# Patient Record
Sex: MALE | Race: WHITE | NOT HISPANIC OR LATINO | URBAN - METROPOLITAN AREA
[De-identification: names, ages, dates, MRNs, and addresses within clinical notes are randomized per-mention and may not be internally consistent; named-entity substitution may affect disease eponyms.]

---

## 2018-03-09 ENCOUNTER — OUTPATIENT (OUTPATIENT)
Dept: OUTPATIENT SERVICES | Facility: HOSPITAL | Age: 48
LOS: 1 days | Discharge: HOME | End: 2018-03-09
Payer: COMMERCIAL

## 2018-03-09 VITALS
SYSTOLIC BLOOD PRESSURE: 115 MMHG | RESPIRATION RATE: 18 BRPM | DIASTOLIC BLOOD PRESSURE: 80 MMHG | HEART RATE: 72 BPM | WEIGHT: 174.17 LBS | OXYGEN SATURATION: 97 % | TEMPERATURE: 98 F | HEIGHT: 70 IN

## 2018-03-09 DIAGNOSIS — E78.5 HYPERLIPIDEMIA, UNSPECIFIED: ICD-10-CM

## 2018-03-09 DIAGNOSIS — Z98.890 OTHER SPECIFIED POSTPROCEDURAL STATES: Chronic | ICD-10-CM

## 2018-03-09 DIAGNOSIS — M54.12 RADICULOPATHY, CERVICAL REGION: ICD-10-CM

## 2018-03-09 DIAGNOSIS — Z01.818 ENCOUNTER FOR OTHER PREPROCEDURAL EXAMINATION: ICD-10-CM

## 2018-03-09 DIAGNOSIS — Z98.1 ARTHRODESIS STATUS: Chronic | ICD-10-CM

## 2018-03-09 DIAGNOSIS — K21.9 GASTRO-ESOPHAGEAL REFLUX DISEASE WITHOUT ESOPHAGITIS: ICD-10-CM

## 2018-03-09 LAB
ANION GAP SERPL CALC-SCNC: 7 MMOL/L — SIGNIFICANT CHANGE UP (ref 5–17)
APTT BLD: 35.6 SEC — SIGNIFICANT CHANGE UP (ref 27.5–37.4)
BASOPHILS # BLD AUTO: 0.14 K/UL — SIGNIFICANT CHANGE UP (ref 0–0.2)
BASOPHILS NFR BLD AUTO: 1.6 % — SIGNIFICANT CHANGE UP (ref 0–2)
BUN SERPL-MCNC: 16 MG/DL — SIGNIFICANT CHANGE UP (ref 7–23)
CALCIUM SERPL-MCNC: 9.1 MG/DL — SIGNIFICANT CHANGE UP (ref 8.5–10.1)
CHLORIDE SERPL-SCNC: 105 MMOL/L — SIGNIFICANT CHANGE UP (ref 96–108)
CO2 SERPL-SCNC: 29 MMOL/L — SIGNIFICANT CHANGE UP (ref 22–31)
CREAT SERPL-MCNC: 0.9 MG/DL — SIGNIFICANT CHANGE UP (ref 0.5–1.3)
EOSINOPHIL # BLD AUTO: 0.55 K/UL — HIGH (ref 0–0.5)
EOSINOPHIL NFR BLD AUTO: 6.5 % — HIGH (ref 0–6)
GLUCOSE SERPL-MCNC: 87 MG/DL — SIGNIFICANT CHANGE UP (ref 70–99)
HBA1C BLD-MCNC: 5.4 % — SIGNIFICANT CHANGE UP (ref 4–5.6)
HCT VFR BLD CALC: 45.6 % — SIGNIFICANT CHANGE UP (ref 39–50)
HGB BLD-MCNC: 15.2 G/DL — SIGNIFICANT CHANGE UP (ref 13–17)
IMM GRANULOCYTES NFR BLD AUTO: 0.8 % — SIGNIFICANT CHANGE UP (ref 0–1.5)
INR BLD: 0.95 RATIO — SIGNIFICANT CHANGE UP (ref 0.88–1.16)
LYMPHOCYTES # BLD AUTO: 2.02 K/UL — SIGNIFICANT CHANGE UP (ref 1–3.3)
LYMPHOCYTES # BLD AUTO: 23.8 % — SIGNIFICANT CHANGE UP (ref 13–44)
MCHC RBC-ENTMCNC: 29.6 PG — SIGNIFICANT CHANGE UP (ref 27–34)
MCHC RBC-ENTMCNC: 33.3 GM/DL — SIGNIFICANT CHANGE UP (ref 32–36)
MCV RBC AUTO: 88.7 FL — SIGNIFICANT CHANGE UP (ref 80–100)
MONOCYTES # BLD AUTO: 0.66 K/UL — SIGNIFICANT CHANGE UP (ref 0–0.9)
MONOCYTES NFR BLD AUTO: 7.8 % — SIGNIFICANT CHANGE UP (ref 2–14)
NEUTROPHILS # BLD AUTO: 5.06 K/UL — SIGNIFICANT CHANGE UP (ref 1.8–7.4)
NEUTROPHILS NFR BLD AUTO: 59.5 % — SIGNIFICANT CHANGE UP (ref 43–77)
PLATELET # BLD AUTO: 304 K/UL — SIGNIFICANT CHANGE UP (ref 150–400)
POTASSIUM SERPL-MCNC: 3.7 MMOL/L — SIGNIFICANT CHANGE UP (ref 3.5–5.3)
POTASSIUM SERPL-SCNC: 3.7 MMOL/L — SIGNIFICANT CHANGE UP (ref 3.5–5.3)
PROTHROM AB SERPL-ACNC: 10.3 SEC — SIGNIFICANT CHANGE UP (ref 9.8–12.7)
RBC # BLD: 5.14 M/UL — SIGNIFICANT CHANGE UP (ref 4.2–5.8)
RBC # FLD: 14 % — SIGNIFICANT CHANGE UP (ref 10.3–14.5)
SODIUM SERPL-SCNC: 141 MMOL/L — SIGNIFICANT CHANGE UP (ref 135–145)
WBC # BLD: 8.5 K/UL — SIGNIFICANT CHANGE UP (ref 3.8–10.5)
WBC # FLD AUTO: 8.5 K/UL — SIGNIFICANT CHANGE UP (ref 3.8–10.5)

## 2018-03-09 PROCEDURE — 93010 ELECTROCARDIOGRAM REPORT: CPT | Mod: NC

## 2018-03-09 NOTE — ASU PATIENT PROFILE, ADULT - PSH
S/P arthroscopy of shoulder  Left ( 2013 )  S/P foot surgery, right  2015  S/P lumbar spinal fusion  2010

## 2018-03-09 NOTE — H&P PST ADULT - NSANTHOSAYNRD_GEN_A_CORE
No. DIRK screening performed.  STOP BANG Legend: 0-2 = LOW Risk; 3-4 = INTERMEDIATE Risk; 5-8 = HIGH Risk

## 2018-03-22 ENCOUNTER — TRANSCRIPTION ENCOUNTER (OUTPATIENT)
Age: 48
End: 2018-03-22

## 2018-03-23 ENCOUNTER — RESULT REVIEW (OUTPATIENT)
Age: 48
End: 2018-03-23

## 2018-03-23 ENCOUNTER — OUTPATIENT (OUTPATIENT)
Dept: OUTPATIENT SERVICES | Facility: HOSPITAL | Age: 48
LOS: 1 days | Discharge: HOME | End: 2018-03-23

## 2018-03-23 ENCOUNTER — INPATIENT (INPATIENT)
Facility: HOSPITAL | Age: 48
LOS: 0 days | Discharge: HOME | End: 2018-03-23
Attending: ORTHOPAEDIC SURGERY | Admitting: ORTHOPAEDIC SURGERY
Payer: COMMERCIAL

## 2018-03-23 VITALS
HEIGHT: 70 IN | TEMPERATURE: 98 F | HEART RATE: 73 BPM | RESPIRATION RATE: 17 BRPM | SYSTOLIC BLOOD PRESSURE: 11 MMHG | WEIGHT: 175.05 LBS | DIASTOLIC BLOOD PRESSURE: 72 MMHG | OXYGEN SATURATION: 98 %

## 2018-03-23 VITALS
OXYGEN SATURATION: 96 % | SYSTOLIC BLOOD PRESSURE: 117 MMHG | DIASTOLIC BLOOD PRESSURE: 88 MMHG | HEART RATE: 74 BPM | TEMPERATURE: 98 F | RESPIRATION RATE: 16 BRPM

## 2018-03-23 DIAGNOSIS — Z98.1 ARTHRODESIS STATUS: Chronic | ICD-10-CM

## 2018-03-23 DIAGNOSIS — Z98.890 OTHER SPECIFIED POSTPROCEDURAL STATES: Chronic | ICD-10-CM

## 2018-03-23 PROCEDURE — 88304 TISSUE EXAM BY PATHOLOGIST: CPT | Mod: 26

## 2018-03-23 RX ORDER — PANTOPRAZOLE SODIUM 20 MG/1
40 TABLET, DELAYED RELEASE ORAL
Qty: 0 | Refills: 0 | Status: DISCONTINUED | OUTPATIENT
Start: 2018-03-23 | End: 2018-03-23

## 2018-03-23 RX ORDER — ACETAMINOPHEN 500 MG
1000 TABLET ORAL ONCE
Qty: 0 | Refills: 0 | Status: COMPLETED | OUTPATIENT
Start: 2018-03-23 | End: 2018-03-23

## 2018-03-23 RX ORDER — SODIUM CHLORIDE 9 MG/ML
1000 INJECTION, SOLUTION INTRAVENOUS
Qty: 0 | Refills: 0 | Status: DISCONTINUED | OUTPATIENT
Start: 2018-03-23 | End: 2018-03-23

## 2018-03-23 RX ORDER — HYDROMORPHONE HYDROCHLORIDE 2 MG/ML
1 INJECTION INTRAMUSCULAR; INTRAVENOUS; SUBCUTANEOUS
Qty: 0 | Refills: 0 | Status: DISCONTINUED | OUTPATIENT
Start: 2018-03-23 | End: 2018-03-23

## 2018-03-23 RX ORDER — OXYCODONE HYDROCHLORIDE 5 MG/1
1 TABLET ORAL
Qty: 30 | Refills: 0 | OUTPATIENT
Start: 2018-03-23 | End: 2018-03-27

## 2018-03-23 RX ORDER — CEFAZOLIN SODIUM 1 G
2000 VIAL (EA) INJECTION EVERY 8 HOURS
Qty: 0 | Refills: 0 | Status: DISCONTINUED | OUTPATIENT
Start: 2018-03-23 | End: 2018-03-23

## 2018-03-23 RX ORDER — OXYCODONE HYDROCHLORIDE 5 MG/1
5 TABLET ORAL EVERY 4 HOURS
Qty: 0 | Refills: 0 | Status: DISCONTINUED | OUTPATIENT
Start: 2018-03-23 | End: 2018-03-23

## 2018-03-23 RX ORDER — DIPHENHYDRAMINE HCL 50 MG
12.5 CAPSULE ORAL EVERY 4 HOURS
Qty: 0 | Refills: 0 | Status: DISCONTINUED | OUTPATIENT
Start: 2018-03-23 | End: 2018-03-23

## 2018-03-23 RX ORDER — LORATADINE 10 MG/1
10 TABLET ORAL DAILY
Qty: 0 | Refills: 0 | Status: DISCONTINUED | OUTPATIENT
Start: 2018-03-23 | End: 2018-03-23

## 2018-03-23 RX ORDER — CYCLOBENZAPRINE HYDROCHLORIDE 10 MG/1
10 TABLET, FILM COATED ORAL EVERY 8 HOURS
Qty: 0 | Refills: 0 | Status: DISCONTINUED | OUTPATIENT
Start: 2018-03-23 | End: 2018-03-23

## 2018-03-23 RX ORDER — OXYCODONE HYDROCHLORIDE 5 MG/1
10 TABLET ORAL EVERY 4 HOURS
Qty: 0 | Refills: 0 | Status: DISCONTINUED | OUTPATIENT
Start: 2018-03-23 | End: 2018-03-23

## 2018-03-23 RX ORDER — SIMVASTATIN 20 MG/1
20 TABLET, FILM COATED ORAL AT BEDTIME
Qty: 0 | Refills: 0 | Status: DISCONTINUED | OUTPATIENT
Start: 2018-03-23 | End: 2018-03-23

## 2018-03-23 RX ORDER — METOCLOPRAMIDE HCL 10 MG
10 TABLET ORAL ONCE
Qty: 0 | Refills: 0 | Status: DISCONTINUED | OUTPATIENT
Start: 2018-03-23 | End: 2018-03-23

## 2018-03-23 RX ADMIN — Medication 1000 MILLIGRAM(S): at 17:05

## 2018-03-23 RX ADMIN — Medication 400 MILLIGRAM(S): at 16:50

## 2018-03-23 RX ADMIN — SODIUM CHLORIDE 75 MILLILITER(S): 9 INJECTION, SOLUTION INTRAVENOUS at 16:25

## 2018-03-23 NOTE — ASU DISCHARGE PLAN (ADULT/PEDIATRIC). - NOTIFY
Swelling that continues/Excessive Diarrhea/Inability to Tolerate Liquids or Foods/Pain not relieved by Medications/Fever greater than 101/Increased Irritability or Sluggishness/Unable to Urinate/Numbness, color, or temperature change to extremity/Persistent Nausea and Vomiting/Numbness, tingling/Bleeding that does not stop

## 2018-03-23 NOTE — PROGRESS NOTE ADULT - SUBJECTIVE AND OBJECTIVE BOX
48yMale s/p TDR c5-7 POD#0 by Dr. Truong. Pt seen and examined in NAD. Pain controlled. Pt denies any new complaints. Pt denies CP/SOB/N/V/D/numbness/tingling/bowel or bladder dysfunction. Pt would like to go home tonight. Explained to pt, maybe better to monitor pt overnight but pt is adamant he wanted to go home and was told by Dr. Truong if he wanted to go home he may go home.     PE:     Spine: Dressing c/d/i   B/L UE: Skin intact. +ROM shoulder/elbow/wrist/fingers. +ok/thumbsup/fingercross signs.  strength: 5/5.  RP2+ NVI.   B/L LE: Skin intact. +ROM hip/knee/ankle/toes. Ankle Dorsi/plantarflexion: 5/5. Calf: soft, compressible and nontender. DP/PT 2+ NVI.             A/P: 48yMale s/p TDR c5-7 POD#0   Case discussed with Dr. Truong - if pt feels well and vitals are stable with no symptoms, pt may be discharged tonight on POD#0. Tried to explain to pt it may be better to be monitored over night for pain control but pt again insists he would like to go home. Pain medication was called into pharmacy. Pt vitals are stable. Pt was able to void and tolerated PO intake. As per Dr. Truong, pt does not need his remaining scips antibiotics. As per Dr. Truong pt is safe for discharge tonight.  Pain controlled  PT: WBAT - spinal precautions   DVT ppx: SCDs   Wound care, Isometric exercises, incentive spirometry   Discharge: planning for home tonight  All the above discussed and understood by pt

## 2018-03-23 NOTE — ASU PREOP CHECKLIST - MUPIRONCIN COMMENTS
Pt became itchy around neck area after using chlorhexidine wipe. Pt instructed to wash neck off with wet cloths

## 2018-03-23 NOTE — ASU DISCHARGE PLAN (ADULT/PEDIATRIC). - MEDICATION SUMMARY - MEDICATIONS TO CHANGE
I will SWITCH the dose or number of times a day I take the medications listed below when I get home from the hospital:    Tylenol  -- 2 tab(s) by mouth prn

## 2018-03-23 NOTE — ASU DISCHARGE PLAN (ADULT/PEDIATRIC). - MEDICATION SUMMARY - MEDICATIONS TO TAKE
I will START or STAY ON the medications listed below when I get home from the hospital:    oxyCODONE 10 mg oral tablet  -- 1 tab(s) by mouth every 4 hours, As needed for pain MDD:6 tablets  -- Indication: For pain control    Tylenol  -- 975 milligram(s) by mouth every 8 hours as needed for pain   -- Indication: For pain control    ZyrTEC  -- 1 tab(s) by mouth once a day  -- Indication: For allergy    simvastatin  -- 1 tab(s) by mouth once a day  -- Indication: For cholesterol    Zegerid OTC 20 mg-1100 mg oral capsule  -- 1 cap(s) by mouth once a day  -- Indication: For proton pump inhibitor    Multiple Vitamins oral tablet  -- 1 tab(s) by mouth once a day  -- Indication: For wound healing

## 2018-03-23 NOTE — ASU DISCHARGE PLAN (ADULT/PEDIATRIC). - MEDICATION SUMMARY - MEDICATIONS TO STOP TAKING
I will STOP taking the medications listed below when I get home from the hospital:    Fish Oil  -- 1 tab(s) by mouth once a day

## 2018-03-23 NOTE — BRIEF OPERATIVE NOTE - PROCEDURE
<<-----Click on this checkbox to enter Procedure Cervical disc arthroplasty  03/23/2018  C 5/6, 6/7  Active  [facility]

## 2018-03-23 NOTE — ASU DISCHARGE PLAN (ADULT/PEDIATRIC). - SPECIAL INSTRUCTIONS
Keep dressing clean, Dry, intact. May shower with dressing on POD#5 (3/28/18). May remove dressing on POD#7 (3/30/18). Leave steri-strips intact - they will fall off by themselves Keep dressing clean, Dry, intact. May shower with dressing on POD#5 (3/28/18). May remove dressing on POD#7 (3/30/18). Leave steri-strips intact - they will fall off by themselves    Spinal Precautions:   NO NSAIDS/ANTICOAGULATION/ANTIPLATELET MEDICATIONS  NO FLEXION/EXTENSION/TWISTING/PUSHING/PULLING/CARRYING/LIFTING OR BENDING

## 2018-03-27 DIAGNOSIS — E78.5 HYPERLIPIDEMIA, UNSPECIFIED: ICD-10-CM

## 2018-03-27 DIAGNOSIS — K21.9 GASTRO-ESOPHAGEAL REFLUX DISEASE WITHOUT ESOPHAGITIS: ICD-10-CM

## 2018-03-27 DIAGNOSIS — M50.122 CERVICAL DISC DISORDER AT C5-C6 LEVEL WITH RADICULOPATHY: ICD-10-CM

## 2018-06-18 PROBLEM — Z00.00 ENCOUNTER FOR PREVENTIVE HEALTH EXAMINATION: Status: ACTIVE | Noted: 2018-06-18

## 2018-08-09 RX ORDER — OMEPRAZOLE AND SODIUM BICARBONATE 40; 1100 MG/1; MG/1
1 CAPSULE, GELATIN COATED ORAL
Qty: 0 | Refills: 0 | COMMUNITY

## 2018-08-09 RX ORDER — ACETAMINOPHEN 500 MG
2 TABLET ORAL
Qty: 0 | Refills: 0 | COMMUNITY

## 2018-08-09 RX ORDER — OMEGA-3 ACID ETHYL ESTERS 1 G
1 CAPSULE ORAL
Qty: 0 | Refills: 0 | COMMUNITY

## 2018-08-09 RX ORDER — SIMVASTATIN 20 MG/1
1 TABLET, FILM COATED ORAL
Qty: 0 | Refills: 0 | COMMUNITY

## 2018-08-09 RX ORDER — ACETAMINOPHEN 500 MG
975 TABLET ORAL
Qty: 0 | Refills: 0 | COMMUNITY

## 2018-08-09 RX ORDER — CETIRIZINE HYDROCHLORIDE 10 MG/1
1 TABLET ORAL
Qty: 0 | Refills: 0 | COMMUNITY

## 2018-08-15 ENCOUNTER — APPOINTMENT (OUTPATIENT)
Dept: OTOLARYNGOLOGY | Facility: CLINIC | Age: 48
End: 2018-08-15

## 2020-03-10 PROBLEM — J30.2 OTHER SEASONAL ALLERGIC RHINITIS: Chronic | Status: ACTIVE | Noted: 2018-03-23

## 2020-03-10 PROBLEM — K21.9 GASTRO-ESOPHAGEAL REFLUX DISEASE WITHOUT ESOPHAGITIS: Chronic | Status: ACTIVE | Noted: 2018-03-09

## 2020-03-10 PROBLEM — E78.5 HYPERLIPIDEMIA, UNSPECIFIED: Chronic | Status: ACTIVE | Noted: 2018-03-09

## 2020-05-04 ENCOUNTER — APPOINTMENT (OUTPATIENT)
Dept: OTOLARYNGOLOGY | Facility: CLINIC | Age: 50
End: 2020-05-04

## 2020-06-03 ENCOUNTER — TRANSCRIPTION ENCOUNTER (OUTPATIENT)
Age: 50
End: 2020-06-03

## 2020-06-03 ENCOUNTER — APPOINTMENT (OUTPATIENT)
Dept: UROLOGY | Facility: CLINIC | Age: 50
End: 2020-06-03
Payer: MEDICAID

## 2020-06-03 VITALS
SYSTOLIC BLOOD PRESSURE: 111 MMHG | DIASTOLIC BLOOD PRESSURE: 73 MMHG | TEMPERATURE: 98.1 F | OXYGEN SATURATION: 99 % | HEART RATE: 70 BPM

## 2020-06-03 DIAGNOSIS — R79.89 OTHER SPECIFIED ABNORMAL FINDINGS OF BLOOD CHEMISTRY: ICD-10-CM

## 2020-06-03 DIAGNOSIS — R37 SEXUAL DYSFUNCTION, UNSPECIFIED: ICD-10-CM

## 2020-06-03 DIAGNOSIS — Z86.39 PERSONAL HISTORY OF OTHER ENDOCRINE, NUTRITIONAL AND METABOLIC DISEASE: ICD-10-CM

## 2020-06-03 DIAGNOSIS — Z80.0 FAMILY HISTORY OF MALIGNANT NEOPLASM OF DIGESTIVE ORGANS: ICD-10-CM

## 2020-06-03 DIAGNOSIS — K57.90 DIVERTICULOSIS OF INTESTINE, PART UNSPECIFIED, W/OUT PERFORATION OR ABSCESS W/OUT BLEEDING: ICD-10-CM

## 2020-06-03 PROCEDURE — 99203 OFFICE O/P NEW LOW 30 MIN: CPT

## 2020-06-03 RX ORDER — SILDENAFIL 20 MG/1
20 TABLET ORAL
Qty: 30 | Refills: 0 | Status: ACTIVE | COMMUNITY
Start: 2020-06-03 | End: 1900-01-01

## 2020-06-03 NOTE — HISTORY OF PRESENT ILLNESS
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 decreased qualithy of erection\par good energy level\par exercises \par sleeps well

## 2020-06-03 NOTE — ASSESSMENT
[FreeTextEntry1] : Mr. Noriega is a 50-year-old gentleman with a past history of neck surgery and diverticulitis.  He was found to have a low testosterone on routine evaluation by his primary care physician.  Prior to that the patient was not aware of any symptoms of hypogonadism.  He does not have a sexual partner at this point.  He does not state that it is libido is somewhat decreased.  He is concerned that the quality of his erection is less reliable and notes that at times he has decreased distal rigidity.\par \par We discussed the potential significance of hypogonadism.  We discussed the signs and symptoms.  \par \par We had an extensive discussion re Risks of T replacement; Patient was informed about Black box warning from FDA.\par We discussed recent data regarding increased risk of coronary plaque size, heart disease; thrombolic event; increased Hbg/Hct, breast tenderness; acne; irritability; sleep apnea and increased urinary symptoms; effect on testicular function including suppression of spermatogenesis; hair loss (male pattern baldness) effect on LFTS; effect on prostate cancer.\par We discussed the need to repeat his testosterone level and expand his endocrinologic evaluation.\par \par We discussed the utilization of phosphodiesterase inhibitors.  He has previously taken these with excellent results without side effects.  Discussed PD5-I possible side effects, onset of action, duration of action, and food interactions.  Discussed behavioral strategies to optimize efficacy of medication.  \par DIscussed the potential advantages and disadvantages as well as side effect profiles of each.\par Warned re priapism and need for intervention to prevent permanent penile injury,\par Discussed dose escalation by as needed (with maximum of 5 tablets =100mg)\par Warned re priapism risk and need for immediate intervention if erection lasts more than 2 hours.  Patient instructed to report to an emergency room and explicitly inform staff of his condition and need for treatment.\par \par At this point repeat blood studies will be obtained.  He will be seen in follow-up in 2 weeks to assess his response to phosphodiesterase inhibitors and discussed potential treatment of hypogonadism if this is confirmed

## 2020-06-03 NOTE — PHYSICAL EXAM
[Normal Appearance] : normal appearance [Edema] : no peripheral edema [Exaggerated Use Of Accessory Muscles For Inspiration] : no accessory muscle use [Bowel Sounds] : normal bowel sounds [Urethral Meatus] : meatus normal [Penis Abnormality] : normal circumcised penis [Epididymis] : the epididymides were normal [Testes Tenderness] : no tenderness of the testes [Prostate Enlargement] : the prostate was not enlarged [Prostate Tenderness] : the prostate was not tender [Normal Station and Gait] : the gait and station were normal for the patient's age [Skin Color & Pigmentation] : normal skin color and pigmentation [No Focal Deficits] : no focal deficits [Oriented To Time, Place, And Person] : oriented to person, place, and time [Affect] : the affect was normal [Mood] : the mood was normal [Not Anxious] : not anxious [Inguinal Lymph Nodes Enlarged Bilaterally] : inguinal [FreeTextEntry1] : DTR, Babinski, proprioception normal

## 2020-06-04 DIAGNOSIS — R31.29 OTHER MICROSCOPIC HEMATURIA: ICD-10-CM

## 2020-06-04 LAB
APPEARANCE: CLEAR
BACTERIA: NEGATIVE
BILIRUBIN URINE: NEGATIVE
BLOOD URINE: NEGATIVE
COLOR: YELLOW
ESTRADIOL SERPL-MCNC: 15 PG/ML
GLUCOSE QUALITATIVE U: NEGATIVE
HYALINE CASTS: 0 /LPF
KETONES URINE: NEGATIVE
LEUKOCYTE ESTERASE URINE: NEGATIVE
MICROSCOPIC-UA: NORMAL
NITRITE URINE: NEGATIVE
PH URINE: 6
PROLACTIN SERPL-MCNC: 9.5 NG/ML
PROTEIN URINE: ABNORMAL
PSA SERPL-MCNC: 2.33 NG/ML
RED BLOOD CELLS URINE: 6 /HPF
SPECIFIC GRAVITY URINE: 1.03
SQUAMOUS EPITHELIAL CELLS: 0 /HPF
TESTOST SERPL-MCNC: 508 NG/DL
UROBILINOGEN URINE: NORMAL
WHITE BLOOD CELLS URINE: 1 /HPF

## 2020-06-09 LAB
APPEARANCE: CLEAR
BACTERIA: NEGATIVE
BILIRUBIN URINE: NEGATIVE
BLOOD URINE: NEGATIVE
COLOR: YELLOW
GLUCOSE QUALITATIVE U: NEGATIVE
HYALINE CASTS: 1 /LPF
KETONES URINE: NEGATIVE
LEUKOCYTE ESTERASE URINE: NEGATIVE
MICROSCOPIC-UA: NORMAL
NITRITE URINE: NEGATIVE
PH URINE: 6.5
PROTEIN URINE: NORMAL
RED BLOOD CELLS URINE: 2 /HPF
SPECIFIC GRAVITY URINE: 1.02
SQUAMOUS EPITHELIAL CELLS: 0 /HPF
UROBILINOGEN URINE: NORMAL
WHITE BLOOD CELLS URINE: 1 /HPF

## 2020-06-10 LAB — BACTERIA UR CULT: NORMAL

## 2020-08-05 ENCOUNTER — APPOINTMENT (OUTPATIENT)
Dept: UROLOGY | Facility: CLINIC | Age: 50
End: 2020-08-05

## 2020-09-22 NOTE — ASU PATIENT PROFILE, ADULT - ABILITY TO HEAR (WITH HEARING AID OR HEARING APPLIANCE IF NORMALLY USED):
Called and spoke to mother to inform her about the referral change. Mother understood   Adequate: hears normal conversation without difficulty

## 2020-09-30 ENCOUNTER — TRANSCRIPTION ENCOUNTER (OUTPATIENT)
Age: 50
End: 2020-09-30

## 2020-12-08 DIAGNOSIS — Z01.818 ENCOUNTER FOR OTHER PREPROCEDURAL EXAMINATION: ICD-10-CM

## 2020-12-09 ENCOUNTER — APPOINTMENT (OUTPATIENT)
Dept: DISASTER EMERGENCY | Facility: CLINIC | Age: 50
End: 2020-12-09

## 2020-12-10 LAB — SARS-COV-2 N GENE NPH QL NAA+PROBE: NOT DETECTED

## 2021-03-05 ENCOUNTER — TRANSCRIPTION ENCOUNTER (OUTPATIENT)
Age: 51
End: 2021-03-05

## 2021-09-12 ENCOUNTER — NON-APPOINTMENT (OUTPATIENT)
Age: 51
End: 2021-09-12

## 2021-09-22 ENCOUNTER — APPOINTMENT (OUTPATIENT)
Dept: DISASTER EMERGENCY | Facility: CLINIC | Age: 51
End: 2021-09-22

## 2021-09-23 LAB — SARS-COV-2 N GENE NPH QL NAA+PROBE: NOT DETECTED

## 2022-04-11 PROBLEM — R37 SEXUAL DYSFUNCTION: Status: ACTIVE | Noted: 2020-06-03

## 2022-06-07 ENCOUNTER — APPOINTMENT (OUTPATIENT)
Dept: PAIN MANAGEMENT | Facility: CLINIC | Age: 52
End: 2022-06-07
Payer: MEDICAID

## 2022-06-07 VITALS — HEIGHT: 70 IN | WEIGHT: 175 LBS | BODY MASS INDEX: 25.05 KG/M2

## 2022-06-07 PROCEDURE — 99214 OFFICE O/P EST MOD 30 MIN: CPT

## 2022-06-07 NOTE — PROCEDURE
[FreeTextEntry3] : Greater Trochanteric Bursa Injection - RIGHT:\par Risks, benefits, and alternatives of injection were discussed. After obtaining informed consent, the RIGHT trochanteric bursa was palpated to determine the site of maximal tenderness overlying the greater trochanter. The skin was cleansed with alcohol and then a 25G 1.5 inch needle was advanced to contact the right greater trochanter. The needle was withdrawn 1-2 mm and after negative aspiration, 4ml of 0.25% Bupivacaine and 6 mg of betamethasone was injected. The needle was then withdrawn and an adhesive bandage was applied to the injection site. The patient tolerated the procedure without any complications.\par

## 2022-06-07 NOTE — DISCUSSION/SUMMARY
[de-identified] : After discussing various treatment options with the patient including but not limited to oral medications, physical therapy, exercise modalities as well as interventional spinal injections, we have decided with the following plan:\par \par - Continue Home exercises, stretching, activity modification, physical therapy, and conservative care.\par - Recommend C7-T1 Cervical Epidural Steroid Injection under fluoroscopic guidance with image.\par - The risks, benefits and alternatives of the proposed procedure were explained in detail with the patient. The risks outlined include but are not limited to infection, bleeding, post-dural puncture headache, nerve injury, a temporary increase in pain, failure to resolve symptoms, allergic reaction, symptom recurrence, and possible elevation of blood sugar in diabetics. All questions were answered to patient's apparent satisfaction and he/she verbalized an understanding.\par - Patient is presenting with acute/sub-acute radicular pain with impairment in ADLs and functionality.  The pain has not responded to conservative care including NSAID therapy and/or physical therapy.  There is no bleeding tendency, unstable medical condition, or systemic infection.\par - Follow up in 1-2 weeks post injection for re-evaluation.\par

## 2022-06-07 NOTE — PHYSICAL EXAM
[de-identified] : Constitutional; Appears well, no apparent distress\par Ability to communicate: Normal \par Respiratory: non-labored breathing\par Skin: No rash noted\par Head: Normocephalic, atraumatic\par Neck: no visible thyroid enlargement\par Eyes: Extraocular movements intact\par Neurologic: Alert and oriented x3\par Psychiatric: normal mood, affect and behavior \par \par  [Flexion] : flexion [] : trochanteric bursa tenderness

## 2022-06-07 NOTE — HISTORY OF PRESENT ILLNESS
[0] : 0 [7] : 7 [Dull/Aching] : dull/aching [Occasional] : occasional [Injection therapy] : injection therapy [Sitting] : sitting [Lying in bed] : lying in bed [] : no [FreeTextEntry1] : right hip

## 2022-11-15 ENCOUNTER — APPOINTMENT (OUTPATIENT)
Dept: PAIN MANAGEMENT | Facility: CLINIC | Age: 52
End: 2022-11-15

## 2022-11-15 VITALS — HEIGHT: 70 IN | WEIGHT: 175 LBS | BODY MASS INDEX: 25.05 KG/M2

## 2022-11-15 PROCEDURE — 99213 OFFICE O/P EST LOW 20 MIN: CPT | Mod: 25

## 2022-11-15 PROCEDURE — J3490M: CUSTOM

## 2022-11-15 PROCEDURE — 20610 DRAIN/INJ JOINT/BURSA W/O US: CPT | Mod: RT

## 2022-11-15 NOTE — DISCUSSION/SUMMARY
[de-identified] : After discussing various treatment options with the patient including but not limited to oral medications, physical therapy, exercise modalities as well as interventional spinal injections, we have decided with the following plan:\par \par - Continue home exercises, stretching, activity modification, physical therapy, and conservative care.\par - Follow-up as needed.\par - Recommend Cyclobenzaprine 10mg BID PRN for muscle spasms and to assist with pain relief.\par - Recommend Gabapentin 600mg BID. Recommend to titrate up gabapentin slowly - first take one pill at night for 3 days and then increase to twice daily. Pt instructed not to drive or operate heavy machinery while on medications.\par - Recommend Meloxicam 15mg daily PRN. Potential adverse effects including but not limited to bleeding, ulcers, increased risk of hypertension, heart disease, kidney disease and stroke were discussed with the patient.  Medication would allow patient to be more mobile and perform ADL's.  Will continue to monitor patient and attempt to wean as soon as possible. Will use the lowest dosage possible for the shortest possible period of time.\par \par

## 2022-11-15 NOTE — HISTORY OF PRESENT ILLNESS
[0] : 0 [7] : 7 [Dull/Aching] : dull/aching [Occasional] : occasional [Injection therapy] : injection therapy [Sitting] : sitting [Lying in bed] : lying in bed [Sharp] : sharp [] : no [FreeTextEntry1] : right hip, right shoulder

## 2022-11-15 NOTE — PHYSICAL EXAM
[Flexion] : flexion [de-identified] : Constitutional; Appears well, no apparent distress\par Ability to communicate: Normal \par Respiratory: non-labored breathing\par Skin: No rash noted\par Head: Normocephalic, atraumatic\par Neck: no visible thyroid enlargement\par Eyes: Extraocular movements intact\par Neurologic: Alert and oriented x3\par Psychiatric: normal mood, affect and behavior \par \par  [] : no trochanteric bursa tenderness

## 2022-11-15 NOTE — PROCEDURE
[Large Joint Injection] : Large joint injection [Right] : of the right [Shoulder] : shoulder [Pain] : pain [Alcohol] : alcohol [___ cc    6mg] :  Betamethasone (Celestone) ~Vcc of 6mg [___ cc    0.25%] : Bupivacaine (Marcaine) ~Vcc of 0.25%  [Call if redness, pain or fever occur] : call if redness, pain or fever occur [Apply ice for 15min out of every hour for the next 12-24 hours as tolerated] : apply ice for 15 minutes out of every hour for the next 12-24 hours as tolerated [Previous OTC use and PT nontherapeutic] : patient has tried OTC's including aspirin, Ibuprofen, Aleve, etc or prescription NSAIDS, and/or exercises at home and/or physical therapy without satisfactory response [Patient had decreased mobility in the joint] : patient had decreased mobility in the joint [Risks, benefits, alternatives discussed / Verbal consent obtained] : the risks benefits, and alternatives have been discussed, and verbal consent was obtained

## 2022-12-27 ENCOUNTER — APPOINTMENT (OUTPATIENT)
Dept: PAIN MANAGEMENT | Facility: CLINIC | Age: 52
End: 2022-12-27

## 2022-12-27 VITALS — HEIGHT: 70 IN | WEIGHT: 175 LBS | BODY MASS INDEX: 25.05 KG/M2

## 2022-12-27 DIAGNOSIS — M79.10 MYALGIA, UNSPECIFIED SITE: ICD-10-CM

## 2022-12-27 PROCEDURE — J3490M: CUSTOM

## 2022-12-27 PROCEDURE — 20610 DRAIN/INJ JOINT/BURSA W/O US: CPT | Mod: RT

## 2022-12-27 PROCEDURE — 99214 OFFICE O/P EST MOD 30 MIN: CPT | Mod: 25

## 2023-01-01 NOTE — H&P PST ADULT - VENOUS THROMBOEMBOLISM CURRENT STATUS
major surgery, including arthroscopic and laparoscopic (greater than 1 hr) [Alert] : alert [Normocephalic] : normocephalic [Flat Open Anterior [place]] : flat open anterior fontanelle [Red Reflex] : red reflex bilateral [PERRL] : PERRL [Normally Placed Ears] : normally placed ears [Auricles Well Formed] : auricles well formed [Clear Tympanic membranes] : clear tympanic membranes [Light reflex present] : light reflex present [Bony landmarks visible] : bony landmarks visible [Nares Patent] : nares patent [Palate Intact] : palate intact [Uvula Midline] : uvula midline [Supple, full passive range of motion] : supple, full passive range of motion [Symmetric Chest Rise] : symmetric chest rise [Regular Rate and Rhythm] : regular rate and rhythm [S1, S2 present] : S1, S2 present [+2 Femoral Pulses] : (+) 2 femoral pulses [Soft] : soft [Bowel Sounds] : bowel sounds present [Central Urethral Opening] : central urethral opening [Testicles Descended] : testicles descended bilaterally [No Abnormal Lymph Nodes Palpated] : no abnormal lymph nodes palpated [Symmetric Abduction and Rotation of hips] : symmetric abduction and rotation of hips [Straight] : straight [Cranial Nerves Grossly Intact] : cranial nerves grossly intact [Acute Distress] : no acute distress [Excessive Tearing] : no excessive tearing [Discharge] : no discharge [Palpable Masses] : no palpable masses [Clear to Auscultation Bilaterally] : not clear to auscultation bilaterally [Murmurs] : no murmurs [Tender] : nontender [Distended] : nondistended [Hepatomegaly] : no hepatomegaly [Splenomegaly] : no splenomegaly [Allis Sign] : negative Allis sign [Rash or Lesions] : no rash/lesions [de-identified] : slight wheezing on expiration bilaterally.

## 2023-01-03 NOTE — HISTORY OF PRESENT ILLNESS
[0] : 0 [7] : 7 [Dull/Aching] : dull/aching [Sharp] : sharp [Injection therapy] : injection therapy [Sitting] : sitting [Lying in bed] : lying in bed [Intermittent] : intermittent [Household chores] : household chores [] : This patient has had an injection before: no [FreeTextEntry1] : right hip, right shoulder

## 2023-01-03 NOTE — DISCUSSION/SUMMARY
[de-identified] : \par After discussing various treatment options with the patient including but not limited to oral medications, physical therapy, exercise modalities as well as interventional spinal injections, we have decided with the following plan:\par \par - Continue Home exercises, stretching, activity modification, physical therapy, and conservative care.\par - MRI report and/or images was reviewed and discussed with the patient.\par - Recommend C6-7 Cervical Epidural Steroid Injection under fluoroscopic guidance with image.\par - The risks, benefits and alternatives of the proposed procedure were explained in detail with the patient. The risks outlined include but are not limited to infection, bleeding, post-dural puncture headache, nerve injury, a temporary increase in pain, failure to resolve symptoms, allergic reaction, symptom recurrence, and possible elevation of blood sugar in diabetics. All questions were answered to patient's apparent satisfaction and he/she verbalized an understanding.\par - Patient is presenting with acute/sub-acute radicular pain with impairment in ADLs and functionality.  The pain has not responded to conservative care including NSAID therapy and/or physical therapy.  There is no bleeding tendency, unstable medical condition, or systemic infection.\par - Follow up in 1-2 weeks post injection for re-evaluation.\par - Recommend Meloxicam 15mg daily PRN. Potential adverse effects including but not limited to bleeding, ulcers, increased risk of hypertension, heart disease, kidney disease and stroke were discussed with the patient.  Medication would allow patient to be more mobile and perform ADL's.  Will continue to monitor patient and attempt to wean as soon as possible. Will use the lowest dosage possible for the shortest possible period of time.\par \par Addendum 01/03/2023: Patient has failed 6 weeks of a prescribed home exercise program.  Patient has completed over 3 weeks of ibuprofen to help reduce pain and swelling with minimum relief.  The patient has completed 6 weeks of activity modification which include heat, ice and rest with also minimum relief.

## 2023-01-03 NOTE — PROCEDURE
TRANSFER - IN REPORT:    Verbal report received from NEGAR Gudino(name) on Nevin Dryer  being received from [facility]) for routine progression of care      Report consisted of patients Situation, Background, Assessment and   Recommendations(SBAR). Information from the following report(s) SBAR, Kardex, OR Summary, Procedure Summary, Intake/Output and MAR was reviewed with the receiving nurse. Opportunity for questions and clarification was provided. Assessment completed upon patients arrival to unit and care assumed. [Large Joint Injection] : Large joint injection [Right] : of the right [Shoulder] : shoulder [Pain] : pain [Alcohol] : alcohol [___ cc    6mg] :  Betamethasone (Celestone) ~Vcc of 6mg [___ cc    0.25%] : Bupivacaine (Marcaine) ~Vcc of 0.25%  [Call if redness, pain or fever occur] : call if redness, pain or fever occur [Apply ice for 15min out of every hour for the next 12-24 hours as tolerated] : apply ice for 15 minutes out of every hour for the next 12-24 hours as tolerated [Previous OTC use and PT nontherapeutic] : patient has tried OTC's including aspirin, Ibuprofen, Aleve, etc or prescription NSAIDS, and/or exercises at home and/or physical therapy without satisfactory response [Patient had decreased mobility in the joint] : patient had decreased mobility in the joint [Risks, benefits, alternatives discussed / Verbal consent obtained] : the risks benefits, and alternatives have been discussed, and verbal consent was obtained [FreeTextEntry3] : Greater Trochanteric Bursa Injection - RIGHT:\par Risks, benefits, and alternatives of injection were discussed. After obtaining informed consent, the RIGHT trochanteric bursa was palpated to determine the site of maximal tenderness overlying the greater trochanter. The skin was cleansed with alcohol and then a 25G 1.5 inch needle was advanced to contact the right greater trochanter. The needle was withdrawn 1-2 mm and after negative aspiration, 4ml of 0.25% Bupivacaine and 6 mg of betamethasone was injected. The needle was then withdrawn and an adhesive bandage was applied to the injection site. The patient tolerated the procedure without any complications.\par

## 2023-01-03 NOTE — PHYSICAL EXAM
[de-identified] : Constitutional; Appears well, no apparent distress\par Ability to communicate: Normal \par Respiratory: non-labored breathing\par Skin: No rash noted\par Head: Normocephalic, atraumatic\par Neck: no visible thyroid enlargement\par Eyes: Extraocular movements intact\par Neurologic: Alert and oriented x3\par Psychiatric: normal mood, affect and behavior \par \par  [] : no trochanteric bursa tenderness

## 2023-01-30 ENCOUNTER — APPOINTMENT (OUTPATIENT)
Dept: PAIN MANAGEMENT | Facility: CLINIC | Age: 53
End: 2023-01-30
Payer: MEDICAID

## 2023-01-30 PROCEDURE — 62321 NJX INTERLAMINAR CRV/THRC: CPT

## 2023-02-21 ENCOUNTER — APPOINTMENT (OUTPATIENT)
Dept: PAIN MANAGEMENT | Facility: CLINIC | Age: 53
End: 2023-02-21
Payer: MEDICAID

## 2023-02-21 VITALS — WEIGHT: 175 LBS | HEIGHT: 70 IN | BODY MASS INDEX: 25.05 KG/M2

## 2023-02-21 DIAGNOSIS — M54.2 CERVICALGIA: ICD-10-CM

## 2023-02-21 PROCEDURE — 99214 OFFICE O/P EST MOD 30 MIN: CPT

## 2023-02-21 RX ORDER — CYCLOBENZAPRINE HYDROCHLORIDE 10 MG/1
10 TABLET, FILM COATED ORAL TWICE DAILY
Qty: 60 | Refills: 1 | Status: ACTIVE | COMMUNITY
Start: 2022-06-07 | End: 1900-01-01

## 2023-02-21 RX ORDER — GABAPENTIN 600 MG/1
600 TABLET, COATED ORAL TWICE DAILY
Qty: 180 | Refills: 1 | Status: ACTIVE | COMMUNITY
Start: 2022-06-07 | End: 1900-01-01

## 2023-02-21 NOTE — DISCUSSION/SUMMARY
[de-identified] : After discussing various treatment options with the patient including but not limited to oral medications, physical therapy, exercise modalities as well as interventional spinal injections, we have decided with the following plan:\par \par - Continue home exercises, stretching, activity modification, physical therapy, and conservative care.\par - Follow-up as needed.\par - Recommend Meloxicam 15mg daily PRN. Potential adverse effects including but not limited to bleeding, ulcers, increased risk of hypertension, heart disease, kidney disease and stroke were discussed with the patient.  Medication would allow patient to be more mobile and perform ADL's.  Will continue to monitor patient and attempt to wean as soon as possible. Will use the lowest dosage possible for the shortest possible period of time.\par - Recommend Cyclobenzaprine 10mg BID PRN for muscle spasms and to assist with pain relief.\par - Recommend to continue Gabapentin 600mg BID. Recommend to titrate up gabapentin slowly - first take one pill at night for 3 days and then increase to twice daily. Pt instructed not to drive or operate heavy machinery while on medications.\par

## 2023-02-21 NOTE — PHYSICAL EXAM
[de-identified] : Constitutional; Appears well, no apparent distress\par Ability to communicate: Normal \par Respiratory: non-labored breathing\par Skin: No rash noted\par Head: Normocephalic, atraumatic\par Neck: no visible thyroid enlargement\par Eyes: Extraocular movements intact\par Neurologic: Alert and oriented x3\par Psychiatric: normal mood, affect and behavior \par \par  [] : no trochanteric bursa tenderness

## 2023-02-22 ENCOUNTER — RX RENEWAL (OUTPATIENT)
Age: 53
End: 2023-02-22

## 2023-03-03 ENCOUNTER — APPOINTMENT (OUTPATIENT)
Dept: ORTHOPEDIC SURGERY | Facility: CLINIC | Age: 53
End: 2023-03-03
Payer: MEDICAID

## 2023-03-03 VITALS — HEIGHT: 70 IN | WEIGHT: 175 LBS | BODY MASS INDEX: 25.05 KG/M2

## 2023-03-03 PROCEDURE — 20610 DRAIN/INJ JOINT/BURSA W/O US: CPT

## 2023-03-03 PROCEDURE — 73030 X-RAY EXAM OF SHOULDER: CPT | Mod: RT

## 2023-03-03 PROCEDURE — 99203 OFFICE O/P NEW LOW 30 MIN: CPT | Mod: 25

## 2023-03-03 RX ORDER — MELOXICAM 15 MG/1
15 TABLET ORAL
Qty: 30 | Refills: 0 | Status: ACTIVE | COMMUNITY
Start: 2022-06-07 | End: 1900-01-01

## 2023-03-03 NOTE — ASSESSMENT
[FreeTextEntry1] : will give cortisone injection to biceps, start rotator cuff strengthening program

## 2023-03-03 NOTE — PROCEDURE
[Large Joint Injection] : Large joint injection [Right] : of the right [Shoulder] : shoulder [Pain] : pain [Alcohol] : alcohol [Betadine] : betadine [Ethyl Chloride sprayed topically] : ethyl chloride sprayed topically [Sterile technique used] : sterile technique used [___ cc    6mg] :  Betamethasone (Celestone) ~Vcc of 6mg [___ cc    1%] : Lidocaine ~Vcc of 1%

## 2023-03-03 NOTE — HISTORY OF PRESENT ILLNESS
[Dull/Aching] : dull/aching [Sharp] : sharp [Frequent] : frequent [Sleep] : sleep [Walking/activity] : walking/activity [de-identified] : Has right shoulder discomfort gerardo positioning at night for sleep. He does lift light weights at the gym, only feels it with certain presses. Has h/o neck pain/radiculopathy on left [] : no [FreeTextEntry1] : right shoulder  [FreeTextEntry3] : 3 months  [FreeTextEntry5] : patient has been feeling pain in the shoulder. no injury. works out \par worse with pushing/ lifting [FreeTextEntry9] : a

## 2023-05-26 ENCOUNTER — APPOINTMENT (OUTPATIENT)
Dept: ORTHOPEDIC SURGERY | Facility: CLINIC | Age: 53
End: 2023-05-26
Payer: MEDICAID

## 2023-05-26 DIAGNOSIS — M25.511 PAIN IN RIGHT SHOULDER: ICD-10-CM

## 2023-05-26 PROCEDURE — 99213 OFFICE O/P EST LOW 20 MIN: CPT | Mod: 25

## 2023-05-26 PROCEDURE — 20610 DRAIN/INJ JOINT/BURSA W/O US: CPT | Mod: RT

## 2023-05-26 NOTE — HISTORY OF PRESENT ILLNESS
[Dull/Aching] : dull/aching [Sharp] : sharp [Frequent] : frequent [Sleep] : sleep [Walking/activity] : walking/activity [de-identified] : 5-26-23- had csi right shoulder 3/3/23 with help for 4 weeks. Pain has returned and he is going on a trip would like repeat csi \par \par Has right shoulder discomfort gerardo positioning at night for sleep. He does lift light weights at the gym, only feels it with certain presses. Has h/o neck pain/radiculopathy on left [] : no [FreeTextEntry1] : right shoulder  [FreeTextEntry3] : 3 months  [FreeTextEntry5] : patient has been feeling pain in the shoulder. no injury. works out \par worse with pushing/ lifting

## 2023-05-26 NOTE — PROCEDURE
[Large Joint Injection] : Large joint injection [Right] : of the right [Shoulder] : shoulder [Pain] : pain [Alcohol] : alcohol [Betadine] : betadine [Ethyl Chloride sprayed topically] : ethyl chloride sprayed topically [Sterile technique used] : sterile technique used [___ cc    6mg] :  Betamethasone (Celestone) ~Vcc of 6mg [___ cc    1%] : Lidocaine ~Vcc of 1%  [] : Patient tolerated procedure well [Call if redness, pain or fever occur] : call if redness, pain or fever occur [Apply ice for 15min out of every hour for the next 12-24 hours as tolerated] : apply ice for 15 minutes out of every hour for the next 12-24 hours as tolerated [Patient was advised to rest the joint(s) for ____ days] : patient was advised to rest the joint(s) for [unfilled] days [Previous OTC use and PT nontherapeutic] : patient has tried OTC's including aspirin, Ibuprofen, Aleve, etc or prescription NSAIDS, and/or exercises at home and/or physical therapy without satisfactory response [Patient had decreased mobility in the joint] : patient had decreased mobility in the joint [Risks, benefits, alternatives discussed / Verbal consent obtained] : the risks benefits, and alternatives have been discussed, and verbal consent was obtained

## 2023-06-05 ENCOUNTER — APPOINTMENT (OUTPATIENT)
Dept: ORTHOPEDIC SURGERY | Facility: CLINIC | Age: 53
End: 2023-06-05

## 2023-07-10 ENCOUNTER — APPOINTMENT (OUTPATIENT)
Dept: ORTHOPEDIC SURGERY | Facility: CLINIC | Age: 53
End: 2023-07-10
Payer: MEDICAID

## 2023-07-10 DIAGNOSIS — G62.9 POLYNEUROPATHY, UNSPECIFIED: ICD-10-CM

## 2023-07-10 PROCEDURE — 99214 OFFICE O/P EST MOD 30 MIN: CPT | Mod: 25

## 2023-07-10 PROCEDURE — 20610 DRAIN/INJ JOINT/BURSA W/O US: CPT | Mod: RT

## 2023-07-10 NOTE — DISCUSSION/SUMMARY
[de-identified] : 1) I discussed the risks, benefits and alternatives of treatment options for the MARY shoulders & left hand, including activity modification, rest, home exercise, oral antiinflammatory medication, \par 2) ** Obtain EMG NCV of left upper extremity to evaluate peripheral neuropathy and possible brachial plexopathy. \par 3) Pt will continue with activity modification and home exercise as tolerated.  The patient should avoid exercise and activity that aggravates pain. \par 4) ** CSI performed to enthesis of the right subscapularis tendon\par \par \par The patient will continue with conservative treatment as described above, and will F/U in after receiving EMG. \par \par \par The patient was advised of the diagnosis.  The natural history of the pathology was explained in full to the patient in layman's terms, including but not limited to the risks, symptoms and available options for treatment.  We discussed the risks, benefits and alternatives of the treatment options and the advice I provided to the patient as listed above.  Pt was given the opportunity to ask questions, and all questions were answered.  The discussion was not limited to the above.\par \par Entered by Sun Mary acting as scribe.

## 2023-07-10 NOTE — IMAGING
[de-identified] : Right Shoulder Exam: AROM is full with slight anterior pain with the release of the abduction force. There is tenderness over the lesser tuberosity and even greater tenderness over the anterior capsule at the GH joint. Passive stretching of the subscapularis and anterior capsule does not produce pain. There are no signs of instability. There is no tenderness over the greater tuberosity. There is no evidence on exam of any internal or external impingement syndrome. Speed's test performed forcefully produces a slight pain response over the bicipital groove.\par \par Left Shoulder and Left Hand Exam: The dorsal left hand symptoms which are mostly paresthesias can be aggravated by forward flexion of the left shoulder with the elbow fully extended as if he were mimicking lying prone with the arms full extended above his head. The pain can be activated by firm pressure applies to the mid portion of the medial scapula border and for a few cm lateral to this spot. Tinel's sign is not positive anywhere on the left upper extremity and shoulder. Phalen's test is negative. The paresthesias are limited to the dorsum of the left hand and do not radiate proximally.\par \par \par

## 2023-07-10 NOTE — PROCEDURE
[FreeTextEntry3] : Large joint injection was performed on the enthesis of the right subscapularis tendon. The indication for this procedure was pain and inflammation. The site was prepped with alcohol, betadine, ethyl chloride sprayed topically and sterile technique used. An injection of Lidocaine 2cc of 1% , Methylprednisolone (Depomedrol) 1cc of 40 mg  was used. \par Patient tolerated procedure well. Patient was advised to call if redness, pain or fever occur and apply ice for 15 minutes out of every hour for the next 12-24 hours as tolerated. \par \par Patient has tried OTC's including aspirin, Ibuprofen, Aleve, etc or prescription NSAIDS, and/or exercises at home and/or physical therapy without satisfactory response, patient had decreased mobility in the joint and the risks benefits, and alternatives have been discussed, and verbal consent was obtained.

## 2023-08-21 ENCOUNTER — APPOINTMENT (OUTPATIENT)
Dept: ORTHOPEDIC SURGERY | Facility: CLINIC | Age: 53
End: 2023-08-21

## 2023-08-22 ENCOUNTER — APPOINTMENT (OUTPATIENT)
Dept: ORTHOPEDIC SURGERY | Facility: CLINIC | Age: 53
End: 2023-08-22
Payer: MEDICAID

## 2023-08-22 VITALS — BODY MASS INDEX: 25.05 KG/M2 | HEIGHT: 70 IN | WEIGHT: 175 LBS

## 2023-08-22 PROCEDURE — 99214 OFFICE O/P EST MOD 30 MIN: CPT

## 2023-08-22 RX ORDER — DICLOFENAC SODIUM 75 MG/1
75 TABLET, DELAYED RELEASE ORAL TWICE DAILY
Qty: 60 | Refills: 1 | Status: COMPLETED | COMMUNITY
Start: 2023-08-22 | End: 2023-10-21

## 2023-08-22 NOTE — DISCUSSION/SUMMARY
[de-identified] : 1) I discussed the risks, benefits and alternatives of treatment options for the MARY shoulders & left hand, including activity modification, rest, home exercise, oral antiinflammatory medication,  2) Pt will continue with activity modification and home exercise as tolerated.  The patient should avoid exercise and activity that aggravates pain.  3) I recommend that the patient consult with Dr. Vaca for workup and treatment of left cubital tunnel.  4) ** Rx Diclofenac 75mg 1BID 2MDD to the patient.  The patient was instructed to take this medication after breakfast and another after dinner for a period of at least 5 days in a row.  The patient should continue to take Diclofenac as directed.  The patient will continue with conservative treatment as described above, and will F/U PRN.   The patient was advised of the diagnosis.  The natural history of the pathology was explained in full to the patient in layman's terms, including but not limited to the risks, symptoms and available options for treatment.  We discussed the risks, benefits and alternatives of the treatment options and the advice I provided to the patient as listed above.  Pt was given the opportunity to ask questions, and all questions were answered.  The discussion was not limited to the above.  Entered by Sun Mary acting as scribe.

## 2023-08-22 NOTE — HISTORY OF PRESENT ILLNESS
[7] : 7 [9] : 9 [Constant] : constant [de-identified] : 07/10/2023: Right Shoulder Pt reports that he had surgery on the neck with Dr. Truong 5 years ago and has not had relief. In March of 2023 patient started seeing Dr. Briggs for the right shoulder, he states he has discomfort that worsens when he lays down at night. Patient states he feels pain when he is at the gym only during certain exercises. Numbness/tingling in the right shoulder blade and right hand. He had a cortisone injection that helped with the pain in May. ** CSI performed to enthesis of the right subscapularis tendon ** ** Excellent lidocaine response **  08/22/2023: Right Shoulder / Cervical Spine Pt is here today to discuss EMG results along with cervical spine MRI results ordered by another provider. He states that there has been no improvement in his pain since the last visit.  **Cervical Spine MRI results taken on 08/17/23 at [facility]** IMPRESSION:  1. Surgical changes from C5 through C6 spinal fusion with limited evaluation of the vertebral bodies and canal secondary to hardware artifact. 2. Mild canal and mild left neural foraminal stenosis at C4-5.   **EMG Upper Extremities taken on 08/09/23** CONCLUSION: 1. The findings of this study are consistent with generalized neuropathy or mild right ulnar neuropathy. 2. In addition, there is electrophysiological evidence of left mild demyelinating ulnar neuropathy at the cubital tunnel. 3. Lastly, there is electrophysiological evidence of left chronic C7 radiculopathy.  [] : no [FreeTextEntry5] : Patient is a 53 Y.O male coming in with neck and right shoulder pain. He states he had surgery on the neck with Dr. Truong 5 years ago and has not had relief. In March of 2023 patient started seeing Dr. Briggs for the right shoulder, he states he has discomfort that worsens when he lays down at night. Patient states he feels pain when he is at the gym only during certain exercises. Numbness/tingling in the right shoulder blade and right hand. He had a cortisone injection that helped with the pain in May. [de-identified] : x-ray

## 2023-08-23 ENCOUNTER — APPOINTMENT (OUTPATIENT)
Dept: ORTHOPEDIC SURGERY | Facility: CLINIC | Age: 53
End: 2023-08-23
Payer: MEDICAID

## 2023-08-23 VITALS — HEIGHT: 70 IN | WEIGHT: 175 LBS | BODY MASS INDEX: 25.05 KG/M2

## 2023-08-23 DIAGNOSIS — M54.12 RADICULOPATHY, CERVICAL REGION: ICD-10-CM

## 2023-08-23 PROCEDURE — 73130 X-RAY EXAM OF HAND: CPT | Mod: LT

## 2023-08-23 PROCEDURE — 73080 X-RAY EXAM OF ELBOW: CPT | Mod: LT

## 2023-08-23 PROCEDURE — 99214 OFFICE O/P EST MOD 30 MIN: CPT

## 2023-08-23 NOTE — ASSESSMENT
[FreeTextEntry1] : The condition was explained to the patient. Unclear etiology of symptoms, may be multifactorial (eg cervical pathology, cubital tunnel syndrome), may be exacerbated by double crush. Patient has had temporary partial improvement in symptoms with RAHUL. Recommend CSI of LEFT cubital tunnel for diagnostic and therapeutic benefit. Patient reports that his Neurologist does not have any availability in the upcoming months. Will refer to PM&R, Dr. Katz.  - provided handout on activity/posture modification and night splint for CuTS.  F/u 6wks.
61 F  PMH CAD Sp PTCA w stents CHF,COPD, CVA, DMT1, ESRD on HD (M,Tu,Th,Sa), Gout, HLD, PVD, Sublcavian Stensosis (L) sp stent. PSH ASD Repair,idania,fem-pop bypass, recurrent admission for hyper/hypoglycemia/DKA, p/w cc of hyperglycemia as outpatient, found with hyperkalemia requiring urgent HD overnight.          Problem/Plan - 1:    ·  Problem: Type 1 diabetes mellitus with hyperglycemia.  Plan: -FSG 500s, AG 18, bhb 0.9; no acidosis on vbg, bicarb 22; appears to have borderline or very early DKA but will likely improve with insulin she has received overnight    -outpt follow up with Endocrinologist.      Problem/Plan - 2:    ·  Problem: Hyperkalemia.  Plan: -HyperK to 7.1; EKG without peaked T    -s/p hyperK cocktail + calcium gluconate + insulin    -HD as per Brayan's recs    -c/w home lasix.          Problem/Plan - 3:    ·  Problem: Acute on chronic systolic heart failure.  Plan: -pt with crackles b/l lung fields, LE edema in setting of ESRD, severe AS    -c/w plans for HD for fluid removal    -c/w maintenance lasix, though it appears pt makes minimal urine.     -c/w lisionpril, toprol    -cards eval in am for severe AS/ADHF, sees Dr. Biswas.          Problem/Plan - 4:    ·  Problem: Coronary artery disease, angina presence unspecified, unspecified vessel or lesion type, unspecified whether native or transplanted heart.  Plan: -c/w asa    stable dc home with follow up with Dr. Biswas.          Problem/Plan - 5:    ·  Problem: ESRD on hemodialysis.  Plan: -HD overnight    -c/w medical mgt of hyperK if needed             Problem/Plan - 6:    Problem: Other chronic pain. Plan: ISTOP Reference #: [id number]    -c/w home percocet 5/325.         Problem/Plan - 7:    ·  Problem: Lower extremity edema.  Plan: -assymetmric RLE > LLE, reportedly unchanged per patient    -previous doppler 3 weeks ago  5/2019 negative for dvt b/l    -monitor for now.         Pt stable dc home with outpt follow up with PMD, Cardiologist and Nephrologist.

## 2023-08-23 NOTE — HISTORY OF PRESENT ILLNESS
[8] : 8 [Dull/Aching] : dull/aching [Radiating] : radiating [Constant] : constant [Sleep] : sleep [Nothing helps with pain getting better] : Nothing helps with pain getting better [de-identified] : 8/23/23: 52yo RHD male presents for LEFT hand pain for years. Pain is primarily over dorsal radial hand into index finger, sometimes dorsal/volar aspect of ulnar. Symptoms worse with elbow flexion. Wakes him from sleep. Denies numbness. Underwent C5-6, C6-7 TR by Dr. Truong on 3/23/18. Reports that shoulder blade pain improved, but returned after a few months. Has had multiple RAHUL by Pain Management, Dr. Martini. Reports 50% improvement in shoulder blade pain and hand pain for ~1 week after injections. Using Gabapentin for nerve pain.  EDX 8/9/23 - IMPRESSION: generalized neuropathy or mild RIGHT ulnar neuropathy. mild LEFT CuTS. chronic LEFT C7 radiculopathy. - L median: DML 3.49 ms, DSL 2.71 ms. - R median: DML 3.49 ms, DSL 3.02 ms. - L ulnar: 26.1 (ADM) and 40.4 (FDI) m/s above to below elbow, 54.5 m/s below elbow to wrist. - R ulnar: 53.6 m/s above to below elbow, 54.5 m/s below elbow to wrist.  Hx: diverticulitis. low testosterone. [] : no [FreeTextEntry5] : L. Hand pain that started years ago. Had cervical disc replacement surgery a few years ago, didn't help pain down left arm. Had EMG done. Saw Luis; referred here.  [FreeTextEntry7] : up/down left arm [de-identified] : EMG

## 2023-09-12 ENCOUNTER — APPOINTMENT (OUTPATIENT)
Dept: ORTHOPEDIC SURGERY | Facility: CLINIC | Age: 53
End: 2023-09-12
Payer: MEDICAID

## 2023-09-12 VITALS — WEIGHT: 175 LBS | BODY MASS INDEX: 25.05 KG/M2 | HEIGHT: 70 IN

## 2023-09-12 DIAGNOSIS — G56.22 LESION OF ULNAR NERVE, LEFT UPPER LIMB: ICD-10-CM

## 2023-09-12 DIAGNOSIS — M54.12 RADICULOPATHY, CERVICAL REGION: ICD-10-CM

## 2023-09-12 PROCEDURE — 99214 OFFICE O/P EST MOD 30 MIN: CPT

## 2023-09-14 ENCOUNTER — APPOINTMENT (OUTPATIENT)
Dept: NEUROLOGY | Facility: CLINIC | Age: 53
End: 2023-09-14

## 2023-10-04 ENCOUNTER — APPOINTMENT (OUTPATIENT)
Dept: ORTHOPEDIC SURGERY | Facility: CLINIC | Age: 53
End: 2023-10-04

## 2023-10-31 ENCOUNTER — APPOINTMENT (OUTPATIENT)
Dept: ORTHOPEDIC SURGERY | Facility: CLINIC | Age: 53
End: 2023-10-31
Payer: MEDICAID

## 2023-10-31 ENCOUNTER — APPOINTMENT (OUTPATIENT)
Dept: ORTHOPEDIC SURGERY | Facility: CLINIC | Age: 53
End: 2023-10-31

## 2023-10-31 VITALS — HEIGHT: 70 IN | WEIGHT: 175 LBS | BODY MASS INDEX: 25.05 KG/M2

## 2023-10-31 DIAGNOSIS — M77.8 OTHER ENTHESOPATHIES, NOT ELSEWHERE CLASSIFIED: ICD-10-CM

## 2023-10-31 PROCEDURE — 73010 X-RAY EXAM OF SHOULDER BLADE: CPT | Mod: RT

## 2023-10-31 PROCEDURE — 99214 OFFICE O/P EST MOD 30 MIN: CPT

## 2023-10-31 PROCEDURE — 73030 X-RAY EXAM OF SHOULDER: CPT | Mod: RT

## 2023-11-13 ENCOUNTER — APPOINTMENT (OUTPATIENT)
Dept: ORTHOPEDIC SURGERY | Facility: CLINIC | Age: 53
End: 2023-11-13

## 2023-11-17 ENCOUNTER — APPOINTMENT (OUTPATIENT)
Dept: MRI IMAGING | Facility: CLINIC | Age: 53
End: 2023-11-17
Payer: MEDICAID

## 2023-11-17 PROCEDURE — 73221 MRI JOINT UPR EXTREM W/O DYE: CPT | Mod: RT

## 2023-11-20 ENCOUNTER — APPOINTMENT (OUTPATIENT)
Dept: ORTHOPEDIC SURGERY | Facility: CLINIC | Age: 53
End: 2023-11-20

## 2023-11-22 ENCOUNTER — APPOINTMENT (OUTPATIENT)
Dept: ORTHOPEDIC SURGERY | Facility: CLINIC | Age: 53
End: 2023-11-22
Payer: MEDICAID

## 2023-11-22 VITALS — HEIGHT: 70 IN | BODY MASS INDEX: 25.05 KG/M2 | WEIGHT: 175 LBS

## 2023-11-22 DIAGNOSIS — M75.21 BICIPITAL TENDINITIS, RIGHT SHOULDER: ICD-10-CM

## 2023-11-22 DIAGNOSIS — M67.911 UNSPECIFIED DISORDER OF SYNOVIUM AND TENDON, RIGHT SHOULDER: ICD-10-CM

## 2023-11-22 DIAGNOSIS — S43.431A SUPERIOR GLENOID LABRUM LESION OF RIGHT SHOULDER, INITIAL ENCOUNTER: ICD-10-CM

## 2023-11-22 PROCEDURE — 99214 OFFICE O/P EST MOD 30 MIN: CPT

## 2024-01-02 NOTE — ASU DISCHARGE PLAN (ADULT/PEDIATRIC). - ACTIVITY LEVEL
[facility] Hospitalist History and Physical      PCP: Pcp, No  Notified via Epic/PS y ?    Chief Complaint:     Chief Complaint   Patient presents with    Fall       History Of Present Illness:    This is a 83-year-old female with history of seizure disorder noncompliant with Keppra, recently admitted in December 2023 after altered mental status and poor possible fall.  She had another fall yesterday according to her daughter also named Radha.  The patient is able to provide no history regarding the fall and has no recollection of the events.  She has been complaining of left-sided elbow pain since the fall and the patient finally agreed to come to the hospital today.  Patient is well-known to me from her prior admissions, please see my discharge summary in September 2023 and patient was admitted after a cervical spine injury, refused to wear any collar or undergo surgery at that time and left AMA.  Patient has repeated admissions to the hospital with recurrent falls many of which have been attributed to her noncompliance with her seizure medications.  Patient states she is tired of answering questions but does not complain of any chest pain shortness of breath nausea vomiting abdominal pain dysuria diarrhea.  She is following commands.  States her pain is controlled.  Complains of her right fingertips feeling like sandpaper since her prior surgery in 2008.    Patient is something    14 point Review of Systems is negative except for as noted above.      Past Medical History  Past Medical History:   Diagnosis Date    Seizures (CMD)        Surgical History  Past Surgical History:   Procedure Laterality Date    Spinal fusion          Social History  Social History     Tobacco Use    Smoking status: Never    Smokeless tobacco: Never   Vaping Use    Vaping Use: never used   Substance Use Topics    Alcohol use: Not Currently    Drug use: Never     Patient denies other alcohol, tobacco, or illicit drug use except for as noted  above.    Family History  No family history on file.  Patient denies/does not have knowledge / is not aware / of any other chronic conditions in mother father or direct siblings.    Allergies  ALLERGIES:  Patient has no known allergies.  Patient denies/does not have knowledge of any other allergies    Home Medications  (Not in a hospital admission)    Reviewed with patient.     Inpatient Medications  Current Facility-Administered Medications   Medication Dose Route Frequency Provider Last Rate Last Admin    acetaminophen (TYLENOL) tablet 650 mg  650 mg Oral Q6H PRN Panfilo Bliss MD        traMADol (ULTRAM) tablet 50 mg  50 mg Oral Q6H PRN Panfilo Bliss MD   50 mg at 01/01/24 1810     Current Outpatient Medications   Medication Sig Dispense Refill    levETIRAcetam (KepPRA) 500 MG tablet Take 500 mg by mouth at bedtime as needed (pt reports takes for pain).      BIOTIN PO Take 1 tablet by mouth daily.      levetiracetam (KEPPRA XR) 500 MG 24 hr tablet Take 2 tablets by mouth daily. 90 tablet 3    polyethylene glycol (MIRALAX) 17 g packet Take 17 g by mouth daily. Stir and dissolve powder in any 4 to 8 ounces of beverage, then drink. 30 each 0    traZODone (DESYREL) 50 MG tablet Take 1 tablet by mouth at bedtime as needed for Sleep. 30 tablet 0     All inpatient medications, side effects and potential interactions discussed.    In/Out  No intake or output data in the 24 hours ending 01/01/24 1816     Physical Exam  Visit Vitals  BP (!) 109/95   Pulse 83   Temp 98.2 °F (36.8 °C)   Resp 18   SpO2 100%       Physical Exam:  General: Alert and oriented, no acute distress  Eyes: no scleral icterus, no conjunctival erythema   Cardio: S1, S2, RRR, no murmur, rub, gallop or thrills noted.   Pulm: Lungs clear to auscultation bilaterally, no wheeze or rhonchi noted.  GI: Soft, non-tender, nondistended. Normal bowel sounds auscultated x4 quadrants  : No suprapubic Tenderness, no CVA tenderness bilaterally  Ext: No  upper or lower extremity edema noted.    Skin: No abnormal bruising or discoloration noted. No jaundice.   Psych: mood irritable  Neuro:  Pt appropriately follows commands. RUE 5/5 b/l UE 5/5 Left UE in a sling    A and ox 3 PERRL        Labs     Recent Labs   Lab 01/01/24  1300 12/26/23  1007   SODIUM 140 139   POTASSIUM 3.9 3.8   CHLORIDE 111* 111*   CO2 24 23   BUN 21* 12   CREATININE 0.98* 0.71   GLUCOSE 136* 107*   ALBUMIN 3.9 3.9   AST 17 18   BILIRUBIN 0.7 0.4   CPK  --  62       Imaging    CT HEAD WO CONTRAST   Final Result      No acute intracranial process.      Electronically Signed by: LINO KIM MD    Signed on: 1/1/2024 12:51 PM    Workstation ID: [id number]      XR FOREARM 2 VIEWS LEFT   Final Result   Acute displaced supracondylar fracture of the left distal humerus.      Electronically Signed by: CRISTI COLEMAN MD    Signed on: 1/1/2024 12:54 PM    Workstation ID: [id number]      XR ELBOW 2 VIEWS LEFT   Final Result   Acute displaced supracondylar fracture of the left distal humerus.      Electronically Signed by: CRISTI COLEMAN MD    Signed on: 1/1/2024 12:54 PM    Workstation ID: [id number]      XR HUMERUS 2 VIEWS LEFT   Final Result   Acute displaced supracondylar fracture of the left distal humerus.      Electronically Signed by: CRISTI COLEMAN MD    Signed on: 1/1/2024 12:54 PM    Workstation ID: [id number]          Microbiology Results       None            I personally reviewed the patient's imaging, radiology and report(s).     LAST ECHO/ECHO STRESS:  No valid procedures specified.    Echocardiogram Results Personally reviewed by me.      Assessment/Plan:  All the following conditions PRESENT ON ADMISSION.        #Acute displaced supracondylar fracture of the left distal humerus.   Xr elbow reviewed.   Orthopedics following plan for OR.     #Fall w/ possible syncope - suspect secondary to seizures as pt is non compliant w/ keppra   Multiple visits to the hospital with  same she does not have any recollection of the events fall occurred yesterday per family  CT head is negative    #hx of  CT cervical spine in August 2023 showed acute spinal injury at C5-C6 including widening of the C5-C6 anterior disc space, possible ligamentous injury, no fracture identified in 8/2023  Pt refused surgery at that time and refused to wear a C collar, was evaluated by neurosurgery at that time.  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 (see MRI 7/2022)     #Medication noncompliance especially w/ seizure meds  # Seizure disorder  Will continue Keppra if patient will take it        #History of posterior occipital cervical instrumented fusion from base of occiput to the C4 level     #History of craniotomy in her teenage years  Patient thinks this occurred at age 18 or 19 and was done to \" relieve pressure\"     #History of memory loss  #Suspected dementia  Recommend outpatient neurocognitive testing  At this time patient is alert and oriented x3 and able to answer all questions  Patient has a very difficult relationship with her daughter also named Radha     DVT ppx lovenox     FULL CODE     DVT Prophylaxis:    Diet:    Nutrition status: Does Not Meet Criteria for Malnutrition    Baseline Activity: Ambulates Independently    CODE STATUS:   Code Status: Prior    Physician Notification:  Consultants notified of patient via Perfect [employer].  Communication: with patient, nurse, ER physician / Resident    MORE than 75 MINS WERE SPENT ON THIS PATIENTS CARE TODAY. This includes the following: Reviewed all vitals, medications, new orders, I/O, labs, micro, radiology, nurses notes, pertinent consultant notes which are reflected in assessment and plan.This does not include time spent on other items of care such as smoking cessation counseling, prolonged care time, and or advanced care planning if applicable.   (Level 1 HP: 40 min, Level 2  HP: 55 min, Level 3 HP: 75 min)       Luzma Nunn DO    [facility] [facility]  1/1/2024 6:16 PM   no exercise/no intercourse/no heavy lifting/weight bearing as tolerated/no sports/gym/no weight bearing/nothing per rectum/no tub baths

## 2024-03-08 ENCOUNTER — APPOINTMENT (OUTPATIENT)
Dept: ORTHOPEDIC SURGERY | Facility: CLINIC | Age: 54
End: 2024-03-08
Payer: COMMERCIAL

## 2024-03-08 VITALS — HEIGHT: 70 IN | WEIGHT: 175 LBS | BODY MASS INDEX: 25.05 KG/M2

## 2024-03-08 PROCEDURE — 99214 OFFICE O/P EST MOD 30 MIN: CPT

## 2024-03-08 NOTE — DATA REVIEWED
[MRI] : MRI [Right] : of the right [I independently reviewed and interpreted images and report] : I independently reviewed and interpreted images and report [Shoulder] : shoulder [FreeTextEntry1] : Mild partial tearing subscapularis, bursitis, AC Arthrosis, supraspinatus tendinopathy,  Biceps subluxation, superior labral tear

## 2024-03-08 NOTE — HISTORY OF PRESENT ILLNESS
[4] : 4 [8] : 8 [Sharp] : sharp [Frequent] : frequent [Injection therapy] : injection therapy [de-identified] : This is Mr. VIRGEN BELLAMY  a 53 year old male who comes in today complaining of right shoulder pain.  He usually sees Dr Smith.  Got csi to right shoulder 4 months ago with relief that lasted up to about a month ago. currently in PT. Was taking diclofenac, meloxicam with some relief.   [] : no [FreeTextEntry7] : down arm [de-identified] : physical therapy/MRI

## 2024-03-08 NOTE — DISCUSSION/SUMMARY
[de-identified] : Continue Physical Therapy and HEP diclofenac discussed possible arthroscopy with subscap repair, biceps tenodesis, SAD, regeneten if symptoms do not continue to improve fu 2-3 mo  ----------------------------------------------------------------------------  All relevant imaging studies pertinent to today's visit, including x-rays, MRI's and/or other advanced imaging studies (CT/etc) were independently interpreted and reviewed with the patient as needed. Implications of the studies together with the patient's clinical picture were discussed to formulate a working diagnosis and management options were detailed.  The patient was advised of the diagnosis.  The natural history of the pathology was explained in full. All questions were answered.  The risks and benefits of conservative and interventional treatment alternatives were explained to the patient

## 2024-03-08 NOTE — IMAGING
[Right] : right shoulder [AC Joint Arthrosis] : AC Joint Arthrosis [Type 2 acromion] : Type 2 acromion [de-identified] :   ----------------------------------------------------------------------------   Right shoulder exam:   Skin: no significant pertinent finding Inspection: no obvious deformity, no obvious masses, no swelling, no effusion, no atrophy ROM:    FF: 180    ER: 65    IR: T12 Tenderness:    (+) Anterior/Biceps:    (neg) Posterior    (neg) Lateral    (neg) Trapezius    (neg) Scapula    (neg) AC joint    (neg) Crepitus with ROM Stability:    (neg) Translation    (neg) Apprehension    (neg) Clicking Additional tests:    (-) Neer's    (-)Hawkin's    (+) Unger's    (+) Speed    (neg) Cross chest adduction Strength:    FF: 5/5    ER: 5/5    IR: 5/5    Biceps: 5/5    Triceps: 5/5    Distal: 5/5 Neuro: In tact to light touch throughout Vascularity: Extremity warm and well perfused   [FreeTextEntry1] : inferior to glenoid calcification

## 2024-04-28 NOTE — IMAGING
[de-identified] : LEFT HAND skin intact. no swelling. no TTP. good elbow extension, flexion. good pronation, supination. wrist ROM: good extension, flexion. good EPL, FPL. good finger extension, flex to full fist. good finger abduction and adduction.  SILT to median, ulnar, radial distributions. palpable radial pulse, brisk cap refill all digits.  5/5, 1st DI 5/5, APB 5/5. no triggering. + ulnar nerve subluxation at the elbow.  Tinel's at cubital tunnel and Tinel's at carpal tunnel => pain to IF. elbow flexion test => tingling to RF/SF.   XRAYS OF LEFT ELBOW: no acute displaced fracture or dislocation. XRAYS OF LEFT HAND: no acute displaced fracture or dislocation.
Specialty Care and/or PCP (routine)...

## 2024-05-08 ENCOUNTER — APPOINTMENT (OUTPATIENT)
Dept: ORTHOPEDIC SURGERY | Facility: CLINIC | Age: 54
End: 2024-05-08
Payer: COMMERCIAL

## 2024-05-08 VITALS — WEIGHT: 175 LBS | BODY MASS INDEX: 25.05 KG/M2 | HEIGHT: 70 IN

## 2024-05-08 PROCEDURE — 99214 OFFICE O/P EST MOD 30 MIN: CPT | Mod: 25

## 2024-05-08 PROCEDURE — J3490M: CUSTOM

## 2024-05-08 PROCEDURE — 20610 DRAIN/INJ JOINT/BURSA W/O US: CPT | Mod: RT

## 2024-05-08 NOTE — HISTORY OF PRESENT ILLNESS
[6] : 6 [3] : 3 [Sharp] : sharp [Frequent] : frequent [Injection therapy] : injection therapy [de-identified] : This is Mr. VIRGEN BELLAMY  a 53 year old male who comes in today complaining of right shoulder pain.  He usually sees Dr Smith.  Got csi to right shoulder 4 months ago with relief that lasted up to about a month ago. currently in PT. Was taking diclofenac, meloxicam with some relief.   [] : no [FreeTextEntry7] : down arm [de-identified] : physical therapy

## 2024-05-08 NOTE — DISCUSSION/SUMMARY
[de-identified] : Discussed PRP, avoiding multiple injections Continue PT / HEP discussed possible arthroscopy with subscap repair, biceps tenodesis, ronda HU if symptoms do not continue to improve Plan for right shoulder SAC fu 2-3 mo  ----------------------------------------------------------------------------  Large joint corticosteroid injection given: Right shoulder subacromial  Patient indicated for injection after trial of rest, OTC medications including aspirin, Ibuprofen, Aleve etc or prescription NSAIDS, and/or exercises at home and/ or physical therapy without satisfactory response.  Patient has symptoms including pain, swelling, and/or decreased mobility in the joint. The risks, benefits, and alternatives to corticosteroid injection were explained in full to the patient, including but not limited to infection, sepsis, bleeding, scarring, skin discoloration, temporary increase in pain, syncopal episode, failure to resolve symptoms, allergic reaction, symptom recurrence, and elevation of blood sugar in diabetics. Patient understood the risks. All questions were answered. After discussion of options, patient requested an injection.   Oral informed consent was obtained and sterile technique was utilized for the procedure including the preparation of the solutions used for the injection and betadine followed by alcohol prep to the injection site. Anesthesia was given with ethyl chloride sprayed topically. The injection was delivered. Patient tolerated the procedure well.   Post Procedure Instructions: Patient was advised to call if redness, pain, or fever occur and apply ice for 15 min on and 15 min off later today  Medications delivered: Kenalog: 10 mg, Lidocaine: 4 cc, Marcaine: 4 cc.  ----------------------------------------------------------------------------  All relevant imaging studies pertinent to today's visit, including x-rays, MRI's and/or other advanced imaging studies (CT/etc) were independently interpreted and reviewed with the patient as needed. Implications of the studies together with the patient's clinical picture were discussed to formulate a working diagnosis and management options were detailed.  The patient was advised of the diagnosis.  The natural history of the pathology was explained in full. All questions were answered.  The risks and benefits of conservative and interventional treatment alternatives were explained to the patient

## 2024-05-08 NOTE — DATA REVIEWED
[MRI] : MRI [Right] : of the right [Shoulder] : shoulder [I independently reviewed and interpreted images and report] : I independently reviewed and interpreted images and report [FreeTextEntry1] : Mild partial tearing subscapularis, bursitis, AC Arthrosis, supraspinatus tendinopathy,  Biceps subluxation, superior labral tear

## 2024-05-08 NOTE — IMAGING
[Right] : right shoulder [AC Joint Arthrosis] : AC Joint Arthrosis [Type 2 acromion] : Type 2 acromion [de-identified] :   ----------------------------------------------------------------------------   Right shoulder exam:   Skin: no significant pertinent finding Inspection: no obvious deformity, no obvious masses, no swelling, no effusion, no atrophy ROM:    FF: 180    ER: 65    IR: T12 Tenderness:    (+) Anterior/Biceps:    (neg) Posterior    (neg) Lateral    (neg) Trapezius    (neg) Scapula    (neg) AC joint    (neg) Crepitus with ROM Stability:    (neg) Translation    (neg) Apprehension    (neg) Clicking Additional tests:    (-) Neer's    (-)Hawkin's    (+) Unger's    (+) Speed    (neg) Cross chest adduction Strength:    FF: 5/5    ER: 5/5    IR: 5/5    Biceps: 5/5    Triceps: 5/5    Distal: 5/5 Neuro: In tact to light touch throughout Vascularity: Extremity warm and well perfused   [FreeTextEntry1] : inferior to glenoid calcification

## 2024-05-26 ENCOUNTER — APPOINTMENT (OUTPATIENT)
Dept: PAIN MANAGEMENT | Facility: CLINIC | Age: 54
End: 2024-05-26
Payer: COMMERCIAL

## 2024-05-26 VITALS — BODY MASS INDEX: 22.19 KG/M2 | WEIGHT: 155 LBS | HEIGHT: 70 IN

## 2024-05-26 DIAGNOSIS — M70.61 TROCHANTERIC BURSITIS, RIGHT HIP: ICD-10-CM

## 2024-05-26 PROCEDURE — J3490M: CUSTOM

## 2024-05-26 PROCEDURE — 99213 OFFICE O/P EST LOW 20 MIN: CPT | Mod: 25

## 2024-05-26 PROCEDURE — 20610 DRAIN/INJ JOINT/BURSA W/O US: CPT | Mod: RT

## 2024-05-26 RX ORDER — DICLOFENAC SODIUM 75 MG/1
75 TABLET, DELAYED RELEASE ORAL
Qty: 60 | Refills: 1 | Status: ACTIVE | COMMUNITY
Start: 2023-10-31 | End: 1900-01-01

## 2024-05-26 NOTE — HISTORY OF PRESENT ILLNESS
[Neck] : neck [0] : 0 [7] : 7 [Dull/Aching] : dull/aching [Radiating] : radiating [Sharp] : sharp [Tingling] : tingling [Intermittent] : intermittent [Injection therapy] : injection therapy [Household chores] : household chores [Sitting] : sitting [Lying in bed] : lying in bed [] : This patient has had an injection before: no [FreeTextEntry1] : right hip, right shoulder, left neck  [FreeTextEntry6] : numbness [FreeTextEntry7] : left scapula, arm to the hands and fingers

## 2024-05-26 NOTE — PHYSICAL EXAM
[de-identified] : Constitutional; Appears well, no apparent distress\par  Ability to communicate: Normal \par  Respiratory: non-labored breathing\par  Skin: No rash noted\par  Head: Normocephalic, atraumatic\par  Neck: no visible thyroid enlargement\par  Eyes: Extraocular movements intact\par  Neurologic: Alert and oriented x3\par  Psychiatric: normal mood, affect and behavior \par  \par   [] : no trochanteric bursa tenderness

## 2024-05-26 NOTE — PROCEDURE
[FreeTextEntry3] : Greater Trochanteric Bursa Injection - RIGHT: Risks, benefits, and alternatives of injection were discussed. After obtaining informed consent, the RIGHT trochanteric bursa was palpated to determine the site of maximal tenderness overlying the greater trochanter. The skin was cleansed with alcohol and then a 25G 1.5 inch needle was advanced to contact the right greater trochanter. The needle was withdrawn 1-2 mm and after negative aspiration, 4ml of 0.25% Bupivacaine and 6 mg of betamethasone was injected. The needle was then withdrawn and an adhesive bandage was applied to the injection site. The patient tolerated the procedure without any complications.

## 2024-05-26 NOTE — DISCUSSION/SUMMARY
[de-identified] : After discussing various treatment options with the patient including but not limited to oral medications, physical therapy, exercise modalities as well as interventional spinal injections, we have decided with the following plan:  - Continue home exercises, stretching, activity modification, physical therapy, and conservative care. - Follow-up as needed. - Recommend Diclofenac 75mg BID PRN. Potential adverse effects including but not limited to bleeding, ulcers, increased risk of hypertension, heart disease, kidney disease and stroke were discussed with the patient.  Medication would allow patient to be more mobile and perform ADL's.  Will continue to monitor patient and attempt to wean as soon as possible. Will use the lowest dosage possible for the shortest possible period of time.

## 2024-07-02 ENCOUNTER — APPOINTMENT (OUTPATIENT)
Dept: ORTHOPEDIC SURGERY | Facility: CLINIC | Age: 54
End: 2024-07-02
Payer: COMMERCIAL

## 2024-07-02 VITALS — HEIGHT: 70 IN | BODY MASS INDEX: 22.19 KG/M2 | WEIGHT: 155 LBS

## 2024-07-02 DIAGNOSIS — M75.21 BICIPITAL TENDINITIS, RIGHT SHOULDER: ICD-10-CM

## 2024-07-02 DIAGNOSIS — M75.111 INCOMPLETE ROTATOR CUFF TEAR OR RUPTURE OF RIGHT SHOULDER, NOT SPECIFIED AS TRAUMATIC: ICD-10-CM

## 2024-07-02 PROCEDURE — 99214 OFFICE O/P EST MOD 30 MIN: CPT

## 2024-11-04 ENCOUNTER — NON-APPOINTMENT (OUTPATIENT)
Age: 54
End: 2024-11-04

## 2024-11-27 ENCOUNTER — APPOINTMENT (OUTPATIENT)
Dept: UROLOGY | Facility: CLINIC | Age: 54
End: 2024-11-27
Payer: COMMERCIAL

## 2024-11-27 VITALS
HEIGHT: 70 IN | SYSTOLIC BLOOD PRESSURE: 106 MMHG | TEMPERATURE: 98.5 F | HEART RATE: 70 BPM | BODY MASS INDEX: 22.05 KG/M2 | RESPIRATION RATE: 16 BRPM | WEIGHT: 154 LBS | OXYGEN SATURATION: 96 % | DIASTOLIC BLOOD PRESSURE: 68 MMHG

## 2024-11-27 DIAGNOSIS — Z80.0 FAMILY HISTORY OF MALIGNANT NEOPLASM OF DIGESTIVE ORGANS: ICD-10-CM

## 2024-11-27 DIAGNOSIS — R31.29 OTHER MICROSCOPIC HEMATURIA: ICD-10-CM

## 2024-11-27 DIAGNOSIS — Z80.42 FAMILY HISTORY OF MALIGNANT NEOPLASM OF PROSTATE: ICD-10-CM

## 2024-11-27 DIAGNOSIS — Z12.5 ENCOUNTER FOR SCREENING FOR MALIGNANT NEOPLASM OF PROSTATE: ICD-10-CM

## 2024-11-27 PROCEDURE — 99203 OFFICE O/P NEW LOW 30 MIN: CPT

## 2024-11-27 RX ORDER — OMEPRAZOLE, SODIUM BICARBONATE 40; 1100 MG/1; MG/1
CAPSULE ORAL
Refills: 0 | Status: ACTIVE | COMMUNITY

## 2024-11-27 RX ORDER — CETIRIZINE HCL 10 MG
TABLET ORAL
Refills: 0 | Status: ACTIVE | COMMUNITY

## 2024-11-27 RX ORDER — SIMVASTATIN 80 MG/1
TABLET, FILM COATED ORAL
Refills: 0 | Status: ACTIVE | COMMUNITY

## 2024-12-02 LAB
APPEARANCE: CLEAR
BACTERIA UR CULT: NORMAL
BACTERIA: NEGATIVE /HPF
BILIRUBIN URINE: NEGATIVE
BLOOD URINE: NEGATIVE
CAST: 0 /LPF
COLOR: NORMAL
EPITHELIAL CELLS: 0 /HPF
GLUCOSE QUALITATIVE U: NEGATIVE MG/DL
KETONES URINE: NEGATIVE MG/DL
LEUKOCYTE ESTERASE URINE: NEGATIVE
MICROSCOPIC-UA: NORMAL
NITRITE URINE: NEGATIVE
PH URINE: 5.5
PROTEIN URINE: NORMAL MG/DL
PSA SERPL-MCNC: 2.64 NG/ML
RED BLOOD CELLS URINE: 2 /HPF
SPECIFIC GRAVITY URINE: >1.03
UROBILINOGEN URINE: 0.2 MG/DL
WHITE BLOOD CELLS URINE: 0 /HPF

## 2025-02-04 ENCOUNTER — APPOINTMENT (OUTPATIENT)
Dept: ORTHOPEDIC SURGERY | Facility: CLINIC | Age: 55
End: 2025-02-04
Payer: SELF-PAY

## 2025-02-04 VITALS — WEIGHT: 154 LBS | BODY MASS INDEX: 22.05 KG/M2 | HEIGHT: 70 IN

## 2025-02-04 DIAGNOSIS — Z80.0 FAMILY HISTORY OF MALIGNANT NEOPLASM OF DIGESTIVE ORGANS: ICD-10-CM

## 2025-02-04 DIAGNOSIS — Z80.42 FAMILY HISTORY OF MALIGNANT NEOPLASM OF PROSTATE: ICD-10-CM

## 2025-02-04 PROCEDURE — 99213 OFFICE O/P EST LOW 20 MIN: CPT

## 2025-02-07 ENCOUNTER — APPOINTMENT (OUTPATIENT)
Dept: PAIN MANAGEMENT | Facility: CLINIC | Age: 55
End: 2025-02-07
Payer: COMMERCIAL

## 2025-02-07 VITALS — WEIGHT: 155 LBS | HEIGHT: 70 IN | BODY MASS INDEX: 22.19 KG/M2

## 2025-02-07 DIAGNOSIS — M54.12 RADICULOPATHY, CERVICAL REGION: ICD-10-CM

## 2025-02-07 DIAGNOSIS — M54.2 CERVICALGIA: ICD-10-CM

## 2025-02-07 PROCEDURE — 99214 OFFICE O/P EST MOD 30 MIN: CPT

## 2025-02-07 RX ORDER — MELOXICAM 15 MG/1
15 TABLET ORAL
Qty: 30 | Refills: 0 | Status: ACTIVE | COMMUNITY
Start: 2025-02-07 | End: 1900-01-01

## 2025-02-18 ENCOUNTER — APPOINTMENT (OUTPATIENT)
Dept: ORTHOPEDIC SURGERY | Facility: CLINIC | Age: 55
End: 2025-02-18
Payer: SELF-PAY

## 2025-02-18 VITALS — BODY MASS INDEX: 22.19 KG/M2 | WEIGHT: 155 LBS | HEIGHT: 70 IN

## 2025-02-18 DIAGNOSIS — G62.9 POLYNEUROPATHY, UNSPECIFIED: ICD-10-CM

## 2025-02-18 PROCEDURE — 99213 OFFICE O/P EST LOW 20 MIN: CPT

## 2025-03-03 ENCOUNTER — APPOINTMENT (OUTPATIENT)
Dept: ORTHOPEDIC SURGERY | Facility: CLINIC | Age: 55
End: 2025-03-03
Payer: COMMERCIAL

## 2025-03-03 DIAGNOSIS — M75.52 BURSITIS OF LEFT SHOULDER: ICD-10-CM

## 2025-03-03 PROCEDURE — 99213 OFFICE O/P EST LOW 20 MIN: CPT | Mod: 25

## 2025-03-03 PROCEDURE — 73010 X-RAY EXAM OF SHOULDER BLADE: CPT | Mod: LT

## 2025-03-03 PROCEDURE — 73030 X-RAY EXAM OF SHOULDER: CPT | Mod: LT

## 2025-03-03 PROCEDURE — J3490M: CUSTOM

## 2025-03-03 PROCEDURE — 20610 DRAIN/INJ JOINT/BURSA W/O US: CPT | Mod: LT

## 2025-03-04 ENCOUNTER — APPOINTMENT (OUTPATIENT)
Dept: PAIN MANAGEMENT | Facility: CLINIC | Age: 55
End: 2025-03-04
Payer: COMMERCIAL

## 2025-03-04 VITALS — BODY MASS INDEX: 22.19 KG/M2 | HEIGHT: 70 IN | WEIGHT: 155 LBS

## 2025-03-04 DIAGNOSIS — M54.2 CERVICALGIA: ICD-10-CM

## 2025-03-04 DIAGNOSIS — M54.12 RADICULOPATHY, CERVICAL REGION: ICD-10-CM

## 2025-03-04 PROCEDURE — J3490M: CUSTOM

## 2025-03-04 PROCEDURE — 20610 DRAIN/INJ JOINT/BURSA W/O US: CPT | Mod: RT

## 2025-03-04 PROCEDURE — 99214 OFFICE O/P EST MOD 30 MIN: CPT | Mod: 25

## 2025-03-12 ENCOUNTER — APPOINTMENT (OUTPATIENT)
Dept: ORTHOPEDIC SURGERY | Facility: CLINIC | Age: 55
End: 2025-03-12

## 2025-03-12 VITALS — BODY MASS INDEX: 22.19 KG/M2 | WEIGHT: 155 LBS | HEIGHT: 70 IN

## 2025-03-12 DIAGNOSIS — M75.21 BICIPITAL TENDINITIS, RIGHT SHOULDER: ICD-10-CM

## 2025-03-12 DIAGNOSIS — M75.111 INCOMPLETE ROTATOR CUFF TEAR OR RUPTURE OF RIGHT SHOULDER, NOT SPECIFIED AS TRAUMATIC: ICD-10-CM

## 2025-03-12 PROCEDURE — 99213 OFFICE O/P EST LOW 20 MIN: CPT

## 2025-04-01 ENCOUNTER — APPOINTMENT (OUTPATIENT)
Dept: PAIN MANAGEMENT | Facility: CLINIC | Age: 55
End: 2025-04-01
Payer: COMMERCIAL

## 2025-04-01 VITALS — WEIGHT: 155 LBS | HEIGHT: 70 IN | BODY MASS INDEX: 22.19 KG/M2

## 2025-04-01 DIAGNOSIS — M70.62 TROCHANTERIC BURSITIS, LEFT HIP: ICD-10-CM

## 2025-04-01 DIAGNOSIS — M70.61 TROCHANTERIC BURSITIS, RIGHT HIP: ICD-10-CM

## 2025-04-01 PROCEDURE — J3490M: CUSTOM

## 2025-04-01 PROCEDURE — 20610 DRAIN/INJ JOINT/BURSA W/O US: CPT | Mod: LT

## 2025-04-01 PROCEDURE — 99213 OFFICE O/P EST LOW 20 MIN: CPT | Mod: 25

## 2025-04-04 ENCOUNTER — RX RENEWAL (OUTPATIENT)
Age: 55
End: 2025-04-04

## 2025-04-09 ENCOUNTER — APPOINTMENT (OUTPATIENT)
Dept: ORTHOPEDIC SURGERY | Facility: CLINIC | Age: 55
End: 2025-04-09
Payer: COMMERCIAL

## 2025-04-09 DIAGNOSIS — M75.52 BURSITIS OF LEFT SHOULDER: ICD-10-CM

## 2025-04-09 DIAGNOSIS — M75.111 INCOMPLETE ROTATOR CUFF TEAR OR RUPTURE OF RIGHT SHOULDER, NOT SPECIFIED AS TRAUMATIC: ICD-10-CM

## 2025-04-09 DIAGNOSIS — M75.42 IMPINGEMENT SYNDROME OF LEFT SHOULDER: ICD-10-CM

## 2025-04-09 PROCEDURE — 99213 OFFICE O/P EST LOW 20 MIN: CPT

## 2025-04-19 ENCOUNTER — APPOINTMENT (OUTPATIENT)
Dept: MRI IMAGING | Facility: CLINIC | Age: 55
End: 2025-04-19

## 2025-04-19 PROCEDURE — 73221 MRI JOINT UPR EXTREM W/O DYE: CPT | Mod: LT

## 2025-04-30 ENCOUNTER — APPOINTMENT (OUTPATIENT)
Dept: ORTHOPEDIC SURGERY | Facility: CLINIC | Age: 55
End: 2025-04-30

## 2025-04-30 DIAGNOSIS — M75.52 BURSITIS OF LEFT SHOULDER: ICD-10-CM

## 2025-04-30 DIAGNOSIS — M75.42 IMPINGEMENT SYNDROME OF LEFT SHOULDER: ICD-10-CM

## 2025-04-30 DIAGNOSIS — M75.111 INCOMPLETE ROTATOR CUFF TEAR OR RUPTURE OF RIGHT SHOULDER, NOT SPECIFIED AS TRAUMATIC: ICD-10-CM

## 2025-04-30 PROCEDURE — 99213 OFFICE O/P EST LOW 20 MIN: CPT

## 2025-07-31 ENCOUNTER — APPOINTMENT (OUTPATIENT)
Dept: ORTHOPEDIC SURGERY | Facility: CLINIC | Age: 55
End: 2025-07-31
Payer: COMMERCIAL

## 2025-07-31 VITALS — BODY MASS INDEX: 22.19 KG/M2 | HEIGHT: 70 IN | WEIGHT: 155 LBS

## 2025-07-31 DIAGNOSIS — M65.932 UNSPECIFIED SYNOVITIS AND TENOSYNOVITIS, LEFT FOREARM: ICD-10-CM

## 2025-07-31 PROCEDURE — 20550 NJX 1 TENDON SHEATH/LIGAMENT: CPT | Mod: LT

## 2025-07-31 PROCEDURE — 99214 OFFICE O/P EST MOD 30 MIN: CPT | Mod: 25

## 2025-07-31 PROCEDURE — 73130 X-RAY EXAM OF HAND: CPT | Mod: LT

## 2025-08-14 ENCOUNTER — APPOINTMENT (OUTPATIENT)
Dept: ORTHOPEDIC SURGERY | Facility: CLINIC | Age: 55
End: 2025-08-14
Payer: COMMERCIAL

## 2025-08-14 DIAGNOSIS — M79.642 PAIN IN LEFT HAND: ICD-10-CM

## 2025-08-14 DIAGNOSIS — G56.22 LESION OF ULNAR NERVE, LEFT UPPER LIMB: ICD-10-CM

## 2025-08-14 PROCEDURE — 99214 OFFICE O/P EST MOD 30 MIN: CPT
